# Patient Record
Sex: FEMALE | Race: WHITE | Employment: UNEMPLOYED | URBAN - METROPOLITAN AREA
[De-identification: names, ages, dates, MRNs, and addresses within clinical notes are randomized per-mention and may not be internally consistent; named-entity substitution may affect disease eponyms.]

---

## 2018-02-20 ENCOUNTER — HOSPITAL ENCOUNTER (OUTPATIENT)
Dept: MAMMOGRAPHY | Age: 53
Discharge: HOME | End: 2018-02-20
Attending: OBSTETRICS & GYNECOLOGY
Payer: MEDICARE

## 2018-02-20 DIAGNOSIS — Z12.31 VISIT FOR SCREENING MAMMOGRAM: ICD-10-CM

## 2018-02-20 PROCEDURE — 77067 SCR MAMMO BI INCL CAD: CPT

## 2018-04-18 ENCOUNTER — APPOINTMENT (RX ONLY)
Dept: URBAN - METROPOLITAN AREA CLINIC 24 | Facility: CLINIC | Age: 53
Setting detail: DERMATOLOGY
End: 2018-04-18

## 2018-04-18 DIAGNOSIS — L91.8 OTHER HYPERTROPHIC DISORDERS OF THE SKIN: ICD-10-CM

## 2018-04-18 DIAGNOSIS — L82.1 OTHER SEBORRHEIC KERATOSIS: ICD-10-CM

## 2018-04-18 DIAGNOSIS — D22 MELANOCYTIC NEVI: ICD-10-CM

## 2018-04-18 DIAGNOSIS — L81.4 OTHER MELANIN HYPERPIGMENTATION: ICD-10-CM

## 2018-04-18 DIAGNOSIS — L82.0 INFLAMED SEBORRHEIC KERATOSIS: ICD-10-CM

## 2018-04-18 PROBLEM — D22.5 MELANOCYTIC NEVI OF TRUNK: Status: ACTIVE | Noted: 2018-04-18

## 2018-04-18 PROCEDURE — 99203 OFFICE O/P NEW LOW 30 MIN: CPT | Mod: 25

## 2018-04-18 PROCEDURE — ? BENIGN DESTRUCTION

## 2018-04-18 PROCEDURE — ? SHAVE REMOVAL

## 2018-04-18 PROCEDURE — ? LIQUID NITROGEN

## 2018-04-18 PROCEDURE — ? COUNSELING

## 2018-04-18 PROCEDURE — 11301 SHAVE SKIN LESION 0.6-1.0 CM: CPT | Mod: 59

## 2018-04-18 PROCEDURE — 17110 DESTRUCTION B9 LES UP TO 14: CPT

## 2018-04-18 ASSESSMENT — LOCATION SIMPLE DESCRIPTION DERM
LOCATION SIMPLE: RIGHT UPPER BACK
LOCATION SIMPLE: RIGHT POSTERIOR THIGH
LOCATION SIMPLE: RIGHT SHOULDER
LOCATION SIMPLE: RIGHT THIGH
LOCATION SIMPLE: LEFT ANTERIOR NECK
LOCATION SIMPLE: ABDOMEN
LOCATION SIMPLE: LEFT UPPER BACK
LOCATION SIMPLE: RIGHT FOREARM

## 2018-04-18 ASSESSMENT — LOCATION DETAILED DESCRIPTION DERM
LOCATION DETAILED: RIGHT POSTERIOR SHOULDER
LOCATION DETAILED: EPIGASTRIC SKIN
LOCATION DETAILED: LEFT SUPERIOR LATERAL UPPER BACK
LOCATION DETAILED: RIGHT ANTERIOR PROXIMAL THIGH
LOCATION DETAILED: LEFT RIB CAGE
LOCATION DETAILED: LEFT INFERIOR LATERAL NECK
LOCATION DETAILED: LEFT INFERIOR ANTERIOR NECK
LOCATION DETAILED: RIGHT INFERIOR UPPER BACK
LOCATION DETAILED: RIGHT DISTAL POSTERIOR THIGH
LOCATION DETAILED: RIGHT PROXIMAL RADIAL DORSAL FOREARM

## 2018-04-18 ASSESSMENT — LOCATION ZONE DERM
LOCATION ZONE: LEG
LOCATION ZONE: NECK
LOCATION ZONE: TRUNK
LOCATION ZONE: ARM

## 2018-04-18 NOTE — PROCEDURE: LIQUID NITROGEN
Post-Care Instructions: I reviewed with the patient in detail post-care instructions. Patient is to wear sunprotection, and avoid picking at any of the treated lesions. Pt may apply Vaseline to crusted or scabbing areas.
Add 52 Modifier (Optional): no
Number Of Freeze-Thaw Cycles: 1 freeze-thaw cycle
Medical Necessity Information: It is in your best interest to select a reason for this procedure from the list below. All of these items fulfill various CMS LCD requirements except the new and changing color options.
Detail Level: Detailed
Consent: The patient's consent was obtained including but not limited to risks of crusting, scabbing, blistering, scarring, darker or lighter pigmentary change, recurrence, incomplete removal and infection.
Medical Necessity Clause: This procedure was medically necessary because the lesions was irritated and itchy.inflamed, bleeding.

## 2018-04-18 NOTE — PROCEDURE: SHAVE REMOVAL
Hemostasis: Electrodesiccation
Notification Instructions: Patient will be notified of biopsy results. However, patient instructed to call the office if not contacted within 2 weeks.
Medical Necessity Information: It is in your best interest to select a reason for this procedure from the list below. All of these items fulfill various CMS LCD requirements except the new and changing color options.
Bill 55789 For Specimen Handling/Conveyance To Laboratory?: no
X Size Of Lesion In Cm (Optional): 0
Detail Level: Detailed
Size Of Margin In Cm (Margins Are Not Added To Billing Dimensions): -
Anesthesia Volume In Cc: 0.4
Post-Care Instructions: I reviewed with the patient in detail post-care instructions. Patient is to keep the biopsy site dry overnight, and then apply bacitracin twice daily until healed. Patient may apply hydrogen peroxide soaks to remove any crusting.
Biopsy Method: Dermablade
Wound Care: Vaseline
Consent was obtained from the patient. The risks and benefits to therapy were discussed in detail. Specifically, the risks of infection, scarring, bleeding, prolonged wound healing, incomplete removal, allergy to anesthesia, nerve injury and recurrence were addressed. Prior to the procedure, the treatment site was clearly identified and confirmed by the patient. All components of Universal Protocol/PAUSE Rule completed.
Was A Bandage Applied: Yes
Size Of Lesion In Cm (Required): 0.8
Path Notes (To The Dermatopathologist): Please check margins.
Billing Type: Third-Party Bill
Anesthesia Type: 1% lidocaine with epinephrine and a 1:10 solution of 8.4% sodium bicarbonate

## 2018-04-18 NOTE — PROCEDURE: BENIGN DESTRUCTION
Add 52 Modifier (Optional): no
Detail Level: Detailed
Consent: The patient's consent was obtained including but not limited to risks of crusting, scabbing, blistering, scarring, darker or lighter pigmentary change, recurrence, incomplete removal and infection.
Medical Necessity Clause: This procedure was medically necessary because the lesions that were treated were: itchy, and irritated.
Anesthesia Volume In Cc: 0.5
Post-Care Instructions: I reviewed with the patient in detail post-care instructions. Patient is to wear sunprotection, and avoid picking at any of the treated lesions. Pt may apply Vaseline to crusted or scabbing areas.
Bill Insurance (You Assume Risk Of Denial Or Audit By Selecting Yes): Yes
Medical Necessity Information: It is in your best interest to select a reason for this procedure from the list below. All of these items fulfill various CMS LCD requirements except the new and changing color options.

## 2019-01-03 PROBLEM — E66.01 OBESITY, MORBID (HCC): Status: ACTIVE | Noted: 2019-01-03

## 2019-02-22 ENCOUNTER — HOSPITAL ENCOUNTER (OUTPATIENT)
Dept: MAMMOGRAPHY | Age: 54
Discharge: HOME | End: 2019-02-22
Attending: OBSTETRICS & GYNECOLOGY
Payer: MEDICARE

## 2019-02-22 DIAGNOSIS — Z12.31 VISIT FOR SCREENING MAMMOGRAM: ICD-10-CM

## 2019-02-22 PROCEDURE — 77067 SCR MAMMO BI INCL CAD: CPT

## 2019-06-03 ENCOUNTER — APPOINTMENT (RX ONLY)
Dept: URBAN - METROPOLITAN AREA CLINIC 24 | Facility: CLINIC | Age: 54
Setting detail: DERMATOLOGY
End: 2019-06-03

## 2019-06-03 DIAGNOSIS — D22 MELANOCYTIC NEVI: ICD-10-CM

## 2019-06-03 DIAGNOSIS — D485 NEOPLASM OF UNCERTAIN BEHAVIOR OF SKIN: ICD-10-CM

## 2019-06-03 DIAGNOSIS — L81.4 OTHER MELANIN HYPERPIGMENTATION: ICD-10-CM

## 2019-06-03 DIAGNOSIS — L82.1 OTHER SEBORRHEIC KERATOSIS: ICD-10-CM

## 2019-06-03 DIAGNOSIS — Z87.2 PERSONAL HISTORY OF DISEASES OF THE SKIN AND SUBCUTANEOUS TISSUE: ICD-10-CM

## 2019-06-03 PROBLEM — D48.5 NEOPLASM OF UNCERTAIN BEHAVIOR OF SKIN: Status: ACTIVE | Noted: 2019-06-03

## 2019-06-03 PROBLEM — D22.61 MELANOCYTIC NEVI OF RIGHT UPPER LIMB, INCLUDING SHOULDER: Status: ACTIVE | Noted: 2019-06-03

## 2019-06-03 PROCEDURE — 11103 TANGNTL BX SKIN EA SEP/ADDL: CPT

## 2019-06-03 PROCEDURE — 99213 OFFICE O/P EST LOW 20 MIN: CPT | Mod: 25

## 2019-06-03 PROCEDURE — ? BIOPSY BY SHAVE METHOD

## 2019-06-03 PROCEDURE — ? COUNSELING

## 2019-06-03 PROCEDURE — 11102 TANGNTL BX SKIN SINGLE LES: CPT

## 2019-06-03 ASSESSMENT — LOCATION SIMPLE DESCRIPTION DERM
LOCATION SIMPLE: ABDOMEN
LOCATION SIMPLE: RIGHT FOREARM
LOCATION SIMPLE: RIGHT THIGH
LOCATION SIMPLE: RIGHT UPPER ARM
LOCATION SIMPLE: RIGHT SHOULDER
LOCATION SIMPLE: RIGHT POSTERIOR THIGH
LOCATION SIMPLE: LEFT UPPER BACK
LOCATION SIMPLE: CHEST
LOCATION SIMPLE: RIGHT UPPER BACK

## 2019-06-03 ASSESSMENT — LOCATION DETAILED DESCRIPTION DERM
LOCATION DETAILED: EPIGASTRIC SKIN
LOCATION DETAILED: RIGHT ANTERIOR PROXIMAL THIGH
LOCATION DETAILED: RIGHT VENTRAL PROXIMAL FOREARM
LOCATION DETAILED: RIGHT POSTERIOR SHOULDER
LOCATION DETAILED: RIGHT ANTERIOR PROXIMAL UPPER ARM
LOCATION DETAILED: RIGHT DISTAL POSTERIOR THIGH
LOCATION DETAILED: RIGHT PROXIMAL RADIAL DORSAL FOREARM
LOCATION DETAILED: LEFT SUPERIOR LATERAL UPPER BACK
LOCATION DETAILED: RIGHT MEDIAL SUPERIOR CHEST
LOCATION DETAILED: RIGHT INFERIOR UPPER BACK

## 2019-06-03 ASSESSMENT — LOCATION ZONE DERM
LOCATION ZONE: TRUNK
LOCATION ZONE: LEG
LOCATION ZONE: ARM

## 2019-06-03 NOTE — HPI: SKIN LESION
What Type Of Note Output Would You Prefer (Optional)?: Standard Output
How Severe Is Your Skin Lesion?: mild
Has Your Skin Lesion Been Treated?: not been treated
Is This A New Presentation, Or A Follow-Up?: Skin Lesion
Additional History: Had run a stick in the leg accidentally a year ago and it took 4 months to heal

## 2019-06-03 NOTE — PROCEDURE: BIOPSY BY SHAVE METHOD
Curettage Text: The wound bed was treated with curettage after the biopsy was performed.
Anesthesia Volume In Cc: 0.5
Wound Care: Petrolatum
Silver Nitrate Text: The wound bed was treated with silver nitrate after the biopsy was performed.
Was A Bandage Applied: Yes
Anesthesia Type: 1% lidocaine with 1:100,000 epinephrine and a 1:6 solution of 8.4% sodium bicarbonate
Bill 18371 For Specimen Handling/Conveyance To Laboratory?: no
Depth Of Biopsy: dermis
Type Of Destruction Used: Curettage
Detail Level: Detailed
Hemostasis: Aluminum Chloride
X Size Of Lesion In Cm: 0
Biopsy Type: H and E
Electrodesiccation And Curettage Text: The wound bed was treated with electrodesiccation and curettage after the biopsy was performed.
Biopsy Method: Dermablade
Dressing: bandage
Billing Type: Third-Party Bill
Cryotherapy Text: The wound bed was treated with cryotherapy after the biopsy was performed.
Electrodesiccation Text: The wound bed was treated with electrodesiccation after the biopsy was performed.
Accession #: PC

## 2020-07-10 ENCOUNTER — APPOINTMENT (RX ONLY)
Dept: URBAN - METROPOLITAN AREA CLINIC 24 | Facility: CLINIC | Age: 55
Setting detail: DERMATOLOGY
End: 2020-07-10

## 2020-07-10 DIAGNOSIS — L82.1 OTHER SEBORRHEIC KERATOSIS: ICD-10-CM

## 2020-07-10 DIAGNOSIS — L82.0 INFLAMED SEBORRHEIC KERATOSIS: ICD-10-CM

## 2020-07-10 DIAGNOSIS — L81.4 OTHER MELANIN HYPERPIGMENTATION: ICD-10-CM

## 2020-07-10 DIAGNOSIS — Z87.2 PERSONAL HISTORY OF DISEASES OF THE SKIN AND SUBCUTANEOUS TISSUE: ICD-10-CM

## 2020-07-10 DIAGNOSIS — D22 MELANOCYTIC NEVI: ICD-10-CM

## 2020-07-10 PROBLEM — D22.62 MELANOCYTIC NEVI OF LEFT UPPER LIMB, INCLUDING SHOULDER: Status: ACTIVE | Noted: 2020-07-10

## 2020-07-10 PROBLEM — D22.61 MELANOCYTIC NEVI OF RIGHT UPPER LIMB, INCLUDING SHOULDER: Status: ACTIVE | Noted: 2020-07-10

## 2020-07-10 PROCEDURE — ? COUNSELING

## 2020-07-10 PROCEDURE — ? LIQUID NITROGEN

## 2020-07-10 PROCEDURE — 99214 OFFICE O/P EST MOD 30 MIN: CPT | Mod: 25

## 2020-07-10 PROCEDURE — ? SHAVE REMOVAL

## 2020-07-10 PROCEDURE — 11300 SHAVE SKIN LESION 0.5 CM/<: CPT | Mod: 59

## 2020-07-10 PROCEDURE — 17110 DESTRUCTION B9 LES UP TO 14: CPT

## 2020-07-10 ASSESSMENT — LOCATION SIMPLE DESCRIPTION DERM
LOCATION SIMPLE: ABDOMEN
LOCATION SIMPLE: LEFT POSTERIOR AXILLA
LOCATION SIMPLE: LEFT SHOULDER
LOCATION SIMPLE: LEFT UPPER BACK
LOCATION SIMPLE: RIGHT SHOULDER
LOCATION SIMPLE: RIGHT THIGH
LOCATION SIMPLE: RIGHT UPPER BACK
LOCATION SIMPLE: RIGHT POSTERIOR THIGH
LOCATION SIMPLE: RIGHT FOREARM

## 2020-07-10 ASSESSMENT — LOCATION DETAILED DESCRIPTION DERM
LOCATION DETAILED: RIGHT ANTERIOR PROXIMAL THIGH
LOCATION DETAILED: LEFT ANTERIOR SHOULDER
LOCATION DETAILED: RIGHT INFERIOR UPPER BACK
LOCATION DETAILED: LEFT POSTERIOR AXILLA
LOCATION DETAILED: RIGHT DISTAL POSTERIOR THIGH
LOCATION DETAILED: RIGHT PROXIMAL RADIAL DORSAL FOREARM
LOCATION DETAILED: RIGHT VENTRAL PROXIMAL FOREARM
LOCATION DETAILED: RIGHT POSTERIOR SHOULDER
LOCATION DETAILED: LEFT SUPERIOR LATERAL UPPER BACK
LOCATION DETAILED: EPIGASTRIC SKIN

## 2020-07-10 ASSESSMENT — LOCATION ZONE DERM
LOCATION ZONE: ARM
LOCATION ZONE: AXILLAE
LOCATION ZONE: LEG
LOCATION ZONE: TRUNK

## 2020-07-10 NOTE — PROCEDURE: LIQUID NITROGEN
Medical Necessity Information: It is in your best interest to select a reason for this procedure from the list below. All of these items fulfill various CMS LCD requirements except the new and changing color options.
Render Note In Bullet Format When Appropriate: No
Medical Necessity Clause: This procedure was medically necessary because the lesions was irritated and itchy.inflamed, bleeding.
Post-Care Instructions: I reviewed with the patient in detail post-care instructions. Patient is to wear sunprotection, and avoid picking at any of the treated lesions. Pt may apply Vaseline to crusted or scabbing areas.
Detail Level: Detailed
Consent: The patient's consent was obtained including but not limited to risks of crusting, scabbing, blistering, scarring, darker or lighter pigmentary change, recurrence, incomplete removal and infection.
Number Of Freeze-Thaw Cycles: 2 freeze-thaw cycles

## 2021-04-02 ENCOUNTER — TRANSCRIBE ORDER (OUTPATIENT)
Dept: SCHEDULING | Age: 56
End: 2021-04-02

## 2021-04-02 DIAGNOSIS — Z12.31 SCREENING MAMMOGRAM FOR HIGH-RISK PATIENT: Primary | ICD-10-CM

## 2021-04-09 ENCOUNTER — HOSPITAL ENCOUNTER (OUTPATIENT)
Dept: MAMMOGRAPHY | Age: 56
Discharge: HOME | End: 2021-04-09
Attending: OBSTETRICS & GYNECOLOGY
Payer: MEDICARE

## 2021-04-09 DIAGNOSIS — Z12.31 SCREENING MAMMOGRAM FOR HIGH-RISK PATIENT: ICD-10-CM

## 2021-04-09 PROCEDURE — 77067 SCR MAMMO BI INCL CAD: CPT

## 2021-07-12 ENCOUNTER — APPOINTMENT (RX ONLY)
Dept: URBAN - METROPOLITAN AREA CLINIC 24 | Facility: CLINIC | Age: 56
Setting detail: DERMATOLOGY
End: 2021-07-12

## 2021-07-12 DIAGNOSIS — Z71.89 OTHER SPECIFIED COUNSELING: ICD-10-CM

## 2021-07-12 DIAGNOSIS — L57.0 ACTINIC KERATOSIS: ICD-10-CM

## 2021-07-12 DIAGNOSIS — L57.8 OTHER SKIN CHANGES DUE TO CHRONIC EXPOSURE TO NONIONIZING RADIATION: ICD-10-CM

## 2021-07-12 DIAGNOSIS — Z87.2 PERSONAL HISTORY OF DISEASES OF THE SKIN AND SUBCUTANEOUS TISSUE: ICD-10-CM

## 2021-07-12 PROCEDURE — ? LIQUID NITROGEN

## 2021-07-12 PROCEDURE — ? COUNSELING

## 2021-07-12 PROCEDURE — 99213 OFFICE O/P EST LOW 20 MIN: CPT | Mod: 25

## 2021-07-12 PROCEDURE — 17000 DESTRUCT PREMALG LESION: CPT

## 2021-07-12 ASSESSMENT — LOCATION DETAILED DESCRIPTION DERM
LOCATION DETAILED: LEFT ANTERIOR PROXIMAL UPPER ARM
LOCATION DETAILED: RIGHT INFERIOR CENTRAL MALAR CHEEK
LOCATION DETAILED: RIGHT SUPERIOR MEDIAL UPPER BACK
LOCATION DETAILED: RIGHT INFERIOR UPPER BACK
LOCATION DETAILED: LEFT MEDIAL SUPERIOR CHEST

## 2021-07-12 ASSESSMENT — LOCATION SIMPLE DESCRIPTION DERM
LOCATION SIMPLE: LEFT UPPER ARM
LOCATION SIMPLE: RIGHT CHEEK
LOCATION SIMPLE: CHEST
LOCATION SIMPLE: RIGHT UPPER BACK

## 2021-07-12 ASSESSMENT — LOCATION ZONE DERM
LOCATION ZONE: ARM
LOCATION ZONE: TRUNK
LOCATION ZONE: FACE

## 2021-07-12 NOTE — PROCEDURE: LIQUID NITROGEN
Post-Care Instructions: I reviewed with the patient in detail post-care instructions. Patient is to wear sunprotection, and avoid picking at any of the treated lesions. Pt may apply Vaseline to crusted or scabbing areas.
Detail Level: Simple
Number Of Freeze-Thaw Cycles: 1 freeze-thaw cycle
Render Post-Care Instructions In Note?: no
Consent: The patient's consent was obtained including but not limited to risks of crusting, scabbing, blistering, scarring, darker or lighter pigmentary change, recurrence, incomplete removal and infection.
Duration Of Freeze Thaw-Cycle (Seconds): 3
Show Applicator Variable?: Yes

## 2021-07-12 NOTE — PROCEDURE: COUNSELING
Detail Level: Simple
Sunscreen Recommendations: 50 SPF+, sun protective clothing
Detail Level: Generalized
Detail Level: Detailed

## 2021-10-25 ENCOUNTER — HOSPITAL ENCOUNTER (OUTPATIENT)
Dept: PHYSICAL THERAPY | Age: 56
Discharge: HOME | End: 2021-10-25
Payer: MEDICARE

## 2021-10-25 PROCEDURE — 97166 OT EVAL MOD COMPLEX 45 MIN: CPT

## 2021-10-25 PROCEDURE — 97535 SELF CARE MNGMENT TRAINING: CPT

## 2021-10-25 NOTE — THERAPY EVALUATION
Keyur Noorvik  : 1965  Primary: Chana Mei Medicare Complete  Secondary:  [address]  at [address]  [address], [address]   [phone]   [phone]           OUTPATIENT OCCUPATIONAL THERAPY: Initial Assessment and Daily Note 10/25/2021    ICD-10: Treatment Diagnosis: I89.0 lymphedema not elsewhere classifed, M79.609 pain in jguhuT06.89 swelling of both LE's  Precautions/Allergies:   Sulfamethoxazole-trimethoprim   Fall Risk Score:    Ambulatory/Rehab Services H2 Model Falls Risk Assessment    Risk Factors:       No Risk Factors Identified Ability to Rise from Chair:       (1)  Pushes up, successful in one attempt    Falls Prevention Plan:       No modifications necessary   Total: (5 or greater = High Risk): 1     [facility] of [employer]. All Rights Reserved. [facility] Patent #[id number]. Federal Law prohibits the replication, distribution or use without written permission from [employer]     MD Orders: eval and treat MEDICAL/REFERRING DIAGNOSIS:   Lymphedema, not elsewhere classified [I89.0]   DATE OF ONSET: chronic   REFERRING PHYSICIAN: Isai, [address]: to be determined      INITIAL ASSESSMENT:  Ms. 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 Historically, she has been able to manage lymphedema by working out in the gym 3-4 days per week both gym and pool exercises. However, her gym closed during the pandemic and she adopted a very sedentary lifestyle which caused weight gain. She tested positive for COVID a few weeks ago and continues to exhibit decreased endurance which has made her even more sedentary. Since COVID, LE pain and swelling have further increased. DVT was ruled out.  She ordered compression knee highs but was unable to don them so she has been bandaging her legs with ACE wraps with assistance from her spouse. . She presents with lipolymphedema with adiposity and large lobules in the thigh region. Swelling is concentrated below knee with ankle cuff and sparing of the feet. She would benefit from skilled OT for complete decongestive therapy to decrease LE swelling, explore compression options and teach Ms. Mena Raf how to manage condition at home. PLAN OF CARE:   PROBLEM LIST:  1. Increased Pain  2. Decreased Activity Tolerance  3. Edema/Girth  4. Decreased Skin Integrity/Hygeine  5. Decreased Chromo with Home Exercise Program INTERVENTIONS PLANNED  1. Skin care  2. Compression bandaging  3. Fitting for compression garment(s)  4. Manual therapy/Manual lymph drainage  5. Therapeutic exercise/Therapeutic activities  6. Patient Education  7. Compression pump trial prn  8.  kinesiotaping    TREATMENT PLAN:  Effective Dates: 10/25/2021 to 1/23/2022 Frequency/Duration: 1x/week up to 90 days  1 xweek x90 days  and upon reassessment. Will adjust frequency and duration as progress indicates. GOALS: (Goals have been discussed and agreed upon with patient.)  Short-Term Functional Goals: Time Frame: 45 days  1. The patient/caregiver will verbalize understanding of lymphedema precautions. 2. Patient will be independent with skin care regimen to decrease risk of cellulitis. 3. The patient/caregiver will be independent at donning and doffing bilateral lower extremity compression bandages. 4. The patient/caregiver will be independent with self-manual lymph drainage techniques and show decrease in limb volume. 5. Patient will be independent in lymphatic exercises. Discharge Goals: Time Frame: 90 days  1. Patient's bilateral lower extremity circumferential measurements will decrease on volumetric graph by 5-7cmto maximize functional use in ADL's.    2. The patient/caregiver will be independent with home management of lymphedema.     3. Patient/caregiver will be independent donning and doffing bilateral lower extremity compression garment. Rehabilitation Potential For Stated Goals: Good  Regarding Hayes Linda Hauser's therapy, I certify that the treatment plan above will be carried out by a therapist or under their direction. Thank you for this referral,  Micky Chávez OTR/L, CLT     Referring Physician Signature: Kelley Momin, * _________________________  Date _________            The information in this section was collected on 10/25/2021   (except where otherwise noted). OCCUPATIONAL PROFILE & HISTORY:   History of Present Injury/Illness (Reason for Referral):  Ms. 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 Historically, she has been able to manage lymphedema by working out in the gym 3-4 days per week both gym and pool exercises. However, her gym closed during the pandemic and she adopted a very sedentary lifestyle which caused weight gain. She tested positive for COVID a few weeks ago and continues to exhibit decreased endurance which has made her even more sedentary. Since COVID, LE pain and swelling have further increased. DVT was ruled out. She ordered compression knee highs but was unable to don them so she has been bandaging her legs with ACE wraps with assistance from her spouse. . She presents with lipolymphedema with adiposity and large lobules in the thigh region. Swelling is concentrated below knee with ankle cuff and sparing of the feet. She would benefit from skilled OT for complete decongestive therapy to decrease LE swelling, explore compression options and teach Ms. Alexandr Willis how to manage condition at home. Past Medical History/Comorbidities:   Ms. Alexandr Willis  has a past medical history of Arthritis, Chronic pain, COVID, Morbid obesity ([address] Morbidly obese ([address] Psychiatric disorder, and Unspecified sleep apnea.  She also has no past medical history of Aneurysm ([address] Arrhythmia, Asthma, Autoimmune disease ([address] CAD (coronary artery disease), Cancer ([address] Chronic kidney disease, Coagulation defects, COPD, Diabetes ([address] Difficult intubation, GERD (gastroesophageal reflux disease), Heart failure ([address] Hypertension, Liver disease, Malignant hyperthermia due to anesthesia, Nausea & vomiting, Other ill-defined conditions(799.89), Pseudocholinesterase deficiency, PUD (peptic ulcer disease), Seizures ([address] Stroke ([address] Thromboembolus ([address] Thyroid disease, or Unspecified adverse effect of anesthesia. Ms. Tessa Gatica  has a past surgical history that includes hx cholecystectomy (2004); hx gastric bypass (2004); neurological procedure unlisted (3/2009); hx orthopaedic; hx back surgery; hx hysterectomy (2011); hx colonoscopy; hx gastric bypass; pr lap,cholecystectomy; and hx bladder suspension. Social History/Living Environment:     pt lives at home with spouse with 4 steps for entry  Prior Level of Function/Work/Activity:  Disabled - sedentary lifestyle  Dominant Side:         RIGHT  Previous Treatment Approaches:          No therapy to date - compression socks/ACE bandages   Current Medications:    Current Outpatient Medications:     estradioL (ESTRACE) 2 mg tablet, Take 1 Tablet by mouth daily. , Disp: 90 Tablet, Rfl: 4    diphenhydrAMINE (BENADRYL) 25 mg capsule, Take 25 mg by mouth., Disp: , Rfl:     potassium 99 mg tablet, Take 99 mg by mouth daily. , Disp: , Rfl:     hydroCHLOROthiazide (MICROZIDE) 12.5 mg capsule, Take 12.5 mg by mouth Every morning., Disp: , Rfl:     melatonin 5 mg tablet, Take 10 mg by mouth. (Patient not taking: Reported on 10/20/2021), Disp: , Rfl:     traMADoL (ULTRAM) 50 mg tablet, Take 50 mg by mouth every six (6) hours as needed. , Disp: , Rfl:     ferrous sulfate (IRON, FERROUS SULFATE,) 325 mg (65 mg elemental iron) tablet, Take 65 mg by mouth Daily (before breakfast). , Disp: , Rfl:     MULTIVITAMIN WITH IRON-MINERAL PO, Take 1 Tab by mouth daily. , Disp: , Rfl:    VITAMIN B-12 PO, Take 1,000 mg by mouth daily. , Disp: , Rfl:     CALCIUM 500 PO, Take 1 Tab by mouth daily. , Disp: , Rfl:             Date Last Reviewed:  10/25/2021   Complexity Level : Expanded review of therapy/medical records (1-2):  MODERATE COMPLEXITY   ASSESSMENT OF OCCUPATIONAL PERFORMANCE:   Palpation:          Bilateral LE lipolymphedema with significant adiposity and fat lobules in thigh region - swelling is below knee/calf region with no swelling in feet/toes - ankle cuff present bilaterally, legs are tender to the touch   ROM:          WFL  Strength:          WFL  Special Tests:          Stemmer sign negative   Skin Integrity:          Skin is intact/no skin changes, pt uses Lubriderm lotion daily   Sensation:  Intact to light touch  Functional Mobility:  Independent with with decreased activity tolerance   Activities of Daily Living :independent with decreased activity tolerance   Edema/Girth:   PRETREATMENT AFFECTED LIMB(s): right lower extremity  left lower extremity      Date:  10-25-21         Right / Left           Groin   []      []           8 inches   []      []           4 inches   []      []         PoplitealSpace   [x]      [x] 54.5/53          8 inches   [x]      [x] 53/56          4 inches   [x]      [x] 36/41          Ankle   [x]      [x] 28.5/30          Instep   [x]      [x] 23.5/23.5        Measurements are taken in centimeters:  2.54 cm = 1 inch  Total:right 195.5cm        left 203.5cm              Physical Skills Involved:  1. Activity Tolerance  2. Edema  3. pain Cognitive Skills Affected (resulting in the inability to perform in a timely and safe manner):  1. Psychosocial Skills Affected:  1. Habits/Routines   Number of elements that affect the Plan of Care: 3-5:  MODERATE COMPLEXITY   CLINICAL DECISION MAKING:   Outcome Measure: Tool Used: Tool Used: Lymphedema Life Impact Scale   Score:  Initial: 39 Most Recent: X (Date: -- )   Interpretation of Score:  The Lymphedema Life Impact Scale (LLIS) is a validated instrument that measures the physical, functional, and psychosocial concerns pertinent to patients with extremity lymphedema. The Scale's questionnaire is administered to patients to gauge impairments, activity limitations, and participation restrictions resulting from their lymphedema. Score 0 1-13 14-26 27-40 41-54 55-67 68   Modifier CH CI CJ CK CL CM CN     ? Other PT/OT Primary Functional Limitations:     - CURRENT STATUS: CK - 40%-59% impaired, limited or restricted    - GOAL STATUS: CJ - 20%-39% impaired, limited or restricted    - D/C STATUS:  ---------------To be determined---------------       Medical Necessity:   · Skilled intervention continues to be required due to uncontrolled swelling increasing risk of cellulitis and hindering ADL's. Reason for Services/Other Comments:  · Patient continues to require skilled intervention due to inability to self manage lymphedema at this time. Clinical Decision-Making Assessment:     Use of outcome tool(s) and clinical judgement create a POC that gives a: Questionable prediction of patient's progress: MODERATE COMPLEXITY   TREATMENT:   (In addition to Assessment/Re-Assessment sessions the following treatments were rendered)    Pre-treatment Symptoms/Complaints:  lipolymphedema with increased LE swelling and pain from baseline measurements  Pain: Initial:   Pain Intensity 1: 7  Post Session:  7   Occupational Therapy Treatments:    OT eval( x ) OT eval was completed 10-25-21. Pt received information on lymphedema and risk reduction/self management practices as outlined by the National Lymphedema Network. Therapeutic Exercise ( minutes):     HEP:  As above; handouts given to patient for all exercises.   Manual Therapy:(minutes)          Manual Lymph Drainage          Lymph Nodes:    Cervical Supraclavicular Axillary Abdominal Inguinal Popliteal Antecubital   RIGHT []     []     []     []     []     []     [] LEFT []     []     []     []     []     []     []          Anastamoses:   Axillo-axillary Inguino-inguinal Axillo-inguinal Inguino-axillary   ANTERIOR []     []     []     []       POSTERIOR []     []     []     []       RIGHT []     []     []     []       LEFT []     []     []     []         Limbs:   []    RUE     []    LUE     []    RLE    []    LLE   ADL/Self Care: (30 minutes): Pt was educated on lymph pathology, CDT, skin integrity management, precautions, exercise/elevation. After skin care, a multi layered compression bandage was applied to bilateral LE's from feet to knees using 5\"biagrip stockinet, lymphesoft foam, 2 short stretch bandages per She was taught principles and techniques of self bandaging with short stretch bandages. Spouse can assist her at home with bandages - they have been bandaging at home with ACE bandages. Pt was instructed to remove if any medical complications ie SOB/increased pain. She was also instructed and given handout on LE exercises to perform while wearing bandages. Treatment/Session Assessment:    · Response to Treatment:  Pt's goal for therapy is to reduce LE swelling and pain back to baseline and explore compression options. She has high co-pay so request 1x/week therapy. She is able to bandage at home with spouse's help and also plans to resume gym exercises. Her insurance pays for her gym membership. Therapy will focus on reducing swelling and teaching pt self management techniques for home program.   · Compliance with Program/Exercises: Will assess as treatment progresses. · Recommendations/Intent for next treatment session: \"Next visit will focus on complete decongestive therapy- reduction phase\".   Total Treatment Duration:60min  OT Patient Time In/Time Out  Time In: 1000  Time Out: [id number] [facility] RYAN NarvaezR/L, TOMAS

## 2021-11-02 ENCOUNTER — HOSPITAL ENCOUNTER (OUTPATIENT)
Dept: PHYSICAL THERAPY | Age: 56
Discharge: HOME | End: 2021-11-02
Payer: MEDICARE

## 2021-11-02 PROCEDURE — 97140 MANUAL THERAPY 1/> REGIONS: CPT

## 2021-11-02 PROCEDURE — 97535 SELF CARE MNGMENT TRAINING: CPT

## 2021-11-02 NOTE — PROGRESS NOTES
Giovanni Galdamez  : 1965  Primary: Hermila Failing Aarp Medicare Complete  Secondary:  [address] [place]  at [place] [address] Therapy  [address], [address]  [phone]   [phone]           OUTPATIENT OCCUPATIONAL THERAPY: Daily Note 2021    ICD-10: Treatment Diagnosis: I89.0 lymphedema not elsewhere classifed, M79.609 pain in hybjwQ97.89 swelling of both LE's  Precautions/Allergies:   Sulfamethoxazole-trimethoprim   Fall Risk Score:    Ambulatory/Rehab Services H2 Model Falls Risk Assessment    Risk Factors:       No Risk Factors Identified Ability to Rise from Chair:       (1)  Pushes up, successful in one attempt    Falls Prevention Plan:       No modifications necessary   Total: (5 or greater = High Risk): 1     [facility] [employer]. All Rights Reserved. [facility] [employer] Patent #[id number]. Federal Law prohibits the replication, distribution or use without written permission from [employer]     MD Orders: eval and treat MEDICAL/REFERRING DIAGNOSIS:   Lymphedema, not elsewhere classified [I89.0]   DATE OF ONSET: chronic   REFERRING PHYSICIAN: Isai, [address] [facility],[address]: to be determined      INITIAL ASSESSMENT:  Ms. 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 Historically, she has been able to manage lymphedema by working out in the gym 3-4 days per week both gym and pool exercises. However, her gym closed during the pandemic and she adopted a very sedentary lifestyle which caused weight gain. She tested positive for COVID a few weeks ago and continues to exhibit decreased endurance which has made her even more sedentary. Since COVID, LE pain and swelling have further increased. DVT was ruled out. She ordered compression knee highs but was unable to don them so she has been bandaging her legs with ACE wraps with assistance from her spouse.  . She presents with lipolymphedema with adiposity and large lobules in the thigh region. Swelling is concentrated below knee with ankle cuff and sparing of the feet. She would benefit from skilled OT for complete decongestive therapy to decrease LE swelling, explore compression options and teach Ms. Dennis Lu how to manage condition at home. PLAN OF CARE:   PROBLEM LIST:  1. Increased Pain  2. Decreased Activity Tolerance  3. Edema/Girth  4. Decreased Skin Integrity/Hygeine  5. Decreased [place] with Home Exercise Program INTERVENTIONS PLANNED  1. Skin care  2. Compression bandaging  3. Fitting for compression garment(s)  4. Manual therapy/Manual lymph drainage  5. Therapeutic exercise/Therapeutic activities  6. Patient Education  7. Compression pump trial prn  8.  kinesiotaping    TREATMENT PLAN:  Effective Dates: 10/25/2021 to 1/23/2022 Frequency/Duration: 1x/week up to 90 days  1 xweek x90 days  and upon reassessment. Will adjust frequency and duration as progress indicates. GOALS: (Goals have been discussed and agreed upon with patient.)  Short-Term Functional Goals: Time Frame: 45 days  1. The patient/caregiver will verbalize understanding of lymphedema precautions. 2. Patient will be independent with skin care regimen to decrease risk of cellulitis. 3. The patient/caregiver will be independent at donning and doffing bilateral lower extremity compression bandages. 4. The patient/caregiver will be independent with self-manual lymph drainage techniques and show decrease in limb volume. 5. Patient will be independent in lymphatic exercises. Discharge Goals: Time Frame: 90 days  1. Patient's bilateral lower extremity circumferential measurements will decrease on volumetric graph by 5-7cmto maximize functional use in ADL's.    2. The patient/caregiver will be independent with home management of lymphedema.     3. Patient/caregiver will be independent donning and doffing bilateral lower extremity compression garment. Rehabilitation Potential For Stated Goals: Good  Regarding Tiffanie Hauser's therapy, I certify that the treatment plan above will be carried out by a therapist or under their direction. Thank you for this referral,  Naheed Lopez OTR/L, CLT                 The information in this section was collected on 11/2/2021   (except where otherwise noted). OCCUPATIONAL PROFILE & HISTORY:   History of Present Injury/Illness (Reason for Referral):  Ms. 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 Historically, she has been able to manage lymphedema by working out in the gym 3-4 days per week both gym and pool exercises. However, her gym closed during the pandemic and she adopted a very sedentary lifestyle which caused weight gain. She tested positive for COVID a few weeks ago and continues to exhibit decreased endurance which has made her even more sedentary. Since COVID, LE pain and swelling have further increased. DVT was ruled out. She ordered compression knee highs but was unable to don them so she has been bandaging her legs with ACE wraps with assistance from her spouse. . She presents with lipolymphedema with adiposity and large lobules in the thigh region. Swelling is concentrated below knee with ankle cuff and sparing of the feet. She would benefit from skilled OT for complete decongestive therapy to decrease LE swelling, explore compression options and teach Ms. Jian Mullen how to manage condition at home. Past Medical History/Comorbidities:   Ms. Jian Mullen  has a past medical history of Arthritis, Chronic pain, COVID, Morbid obesity ([address] Morbidly obese ([address] Psychiatric disorder, and Unspecified sleep apnea.  She also has no past medical history of Aneurysm ([address] Arrhythmia, Asthma, Autoimmune disease ([address] CAD (coronary artery disease), Cancer ([address] Chronic kidney disease, Coagulation defects, COPD, Diabetes ([address] Difficult intubation, GERD (gastroesophageal reflux disease), Heart failure ([address] Hypertension, Liver disease, Malignant hyperthermia due to anesthesia, Nausea & vomiting, Other ill-defined conditions(799.89), Pseudocholinesterase deficiency, PUD (peptic ulcer disease), Seizures ([address] Stroke ([address] Thromboembolus ([address] Thyroid disease, or Unspecified adverse effect of anesthesia. Ms. Jeff Soares  has a past surgical history that includes hx cholecystectomy (2004); hx gastric bypass (2004); neurological procedure unlisted (3/2009); hx orthopaedic; hx back surgery; hx hysterectomy (2011); hx colonoscopy; hx gastric bypass; pr lap,cholecystectomy; and hx bladder suspension. Social History/Living Environment:     pt lives at home with spouse with 4 steps for entry  Prior Level of Function/Work/Activity:  Disabled - sedentary lifestyle  Dominant Side:         RIGHT  Previous Treatment Approaches:          No therapy to date - compression socks/ACE bandages   Current Medications:    Current Outpatient Medications:     estradioL (ESTRACE) 2 mg tablet, Take 1 Tablet by mouth daily. , Disp: 90 Tablet, Rfl: 4    diphenhydrAMINE (BENADRYL) 25 mg capsule, Take 25 mg by mouth., Disp: , Rfl:     potassium 99 mg tablet, Take 99 mg by mouth daily. , Disp: , Rfl:     hydroCHLOROthiazide (MICROZIDE) 12.5 mg capsule, Take 12.5 mg by mouth Every morning., Disp: , Rfl:     melatonin 5 mg tablet, Take 10 mg by mouth. (Patient not taking: Reported on 10/20/2021), Disp: , Rfl:     traMADoL (ULTRAM) 50 mg tablet, Take 50 mg by mouth every six (6) hours as needed. , Disp: , Rfl:     ferrous sulfate (IRON, FERROUS SULFATE,) 325 mg (65 mg elemental iron) tablet, Take 65 mg by mouth Daily (before breakfast). , Disp: , Rfl:     MULTIVITAMIN WITH IRON-MINERAL PO, Take 1 Tab by mouth daily. , Disp: , Rfl:     VITAMIN B-12 PO, Take 1,000 mg by mouth daily. , Disp: , Rfl:     CALCIUM 500 PO, Take 1 Tab by mouth daily. , Disp: , Rfl: Date Last Reviewed:  11/2/2021   Complexity Level : Expanded review of therapy/medical records (1-2):  MODERATE COMPLEXITY   ASSESSMENT OF OCCUPATIONAL PERFORMANCE:   Palpation:          Bilateral LE lipolymphedema with significant adiposity and fat lobules in thigh region - swelling is below knee/calf region with no swelling in feet/toes - ankle cuff present bilaterally, legs are tender to the touch   ROM:          WFL  Strength:          WFL  Special Tests:          Stemmer sign negative   Skin Integrity:          Skin is intact/no skin changes, pt uses Lubriderm lotion daily   Sensation:  Intact to light touch  Functional Mobility:  Independent with with decreased activity tolerance   Activities of Daily Living :independent with decreased activity tolerance   Edema/Girth:   PRETREATMENT AFFECTED LIMB(s): right lower extremity  left lower extremity      Date:  10-25-21 11-2-21        Right / Left           Groin   []      []           8 inches   []      []           4 inches   []      []         PoplitealSpace   [x]      [x] 54.5/53 52/53         8 inches   [x]      [x] 53/56 50/52         4 inches   [x]      [x] 36/41 35.5/36         Ankle   [x]      [x] 28.5/30 26/27         Instep   [x]      [x] 23.5/23.5 22.5/22       Measurements are taken in centimeters:  2.54 cm = 1 inch  Total:right 195.5cm 186.0cm       left 203.5cm 190.0cm             Physical Skills Involved:  1. Activity Tolerance  2. Edema  3. pain Cognitive Skills Affected (resulting in the inability to perform in a timely and safe manner):  1. Psychosocial Skills Affected:  1. Habits/Routines   Number of elements that affect the Plan of Care: 3-5:  MODERATE COMPLEXITY   CLINICAL DECISION MAKING:   Outcome Measure: Tool Used: Tool Used: Lymphedema Life Impact Scale   Score:  Initial: 39 Most Recent: X (Date: -- )   Interpretation of Score:  The Lymphedema Life Impact Scale (LLIS) is a validated instrument that measures the physical, functional, and psychosocial concerns pertinent to patients with extremity lymphedema. The Scale's questionnaire is administered to patients to gauge impairments, activity limitations, and participation restrictions resulting from their lymphedema. Score 0 1-13 14-26 27-40 41-54 55-67 68   Modifier CH CI CJ CK CL CM CN     ? Other PT/OT Primary Functional Limitations:     - CURRENT STATUS: CK - 40%-59% impaired, limited or restricted    - GOAL STATUS: CJ - 20%-39% impaired, limited or restricted    - D/C STATUS:  ---------------To be determined---------------       Medical Necessity:   · Skilled intervention continues to be required due to uncontrolled swelling increasing risk of cellulitis and hindering ADL's. Reason for Services/Other Comments:  · Patient continues to require skilled intervention due to inability to self manage lymphedema at this time. Clinical Decision-Making Assessment:     Use of outcome tool(s) and clinical judgement create a POC that gives a: Questionable prediction of patient's progress: MODERATE COMPLEXITY   TREATMENT:   (In addition to Assessment/Re-Assessment sessions the following treatments were rendered)    Pre-treatment Symptoms/Complaints: Pt has been bandaging as much as she can and wearing biagrip when not in bandages. Overall total of circumferential measurements decreased by 9.5cm in the RLE and 13.5cm in the LLE. She is interested in Blenda Giovanni. Will request Rx from MD and order from [facility]. Pain: Initial:   Pain Intensity 1: 7  Post Session:  6   Occupational Therapy Treatments:    OT eval( x ) OT eval was completed 10-25-21. Pt received information on lymphedema and risk reduction/self management practices as outlined by the National Lymphedema Network. Therapeutic Exercise ( minutes):     HEP:  As above; handouts given to patient for all exercises. Manual Therapy:(45minutes)MLD treatment to decrease swelling in LE's.  Pt was instructed on self MLD for home program and given handout with visual and verbal instructions. Manual Lymph Drainage          Lymph Nodes:    Cervical Supraclavicular Axillary Abdominal Inguinal Popliteal Antecubital   RIGHT []     [x]     [x]     [x]     [x]     [x]     []       LEFT []     [x]     [x]     [x]     [x]     [x]     []          Anastamoses:   Axillo-axillary Inguino-inguinal Axillo-inguinal Inguino-axillary   ANTERIOR []     []     []     []       POSTERIOR []     []     []     []       RIGHT []     []     []     []       LEFT []     []     []     []         Limbs:   []    RUE     []    LUE     [x]    RLE    [x]    LLE   ADL/Self Care: (15minutes): Pt was measured for Luigi Ales Wraps: size M, regular length and order was initiated with [facility]. Biagrip applied after treatment. She will bandage when she gets home. Treatment/Session Assessment:    · Response to Treatment:  Pt is compliant with home program and progressing towards goals. · Compliance with Program/Exercises: compliant to date. · Recommendations/Intent for next treatment session: \"Next visit will focus on complete decongestive therapy- reduction phase\".   Total Treatment Duration:60min  OT Patient Time In/Time Out  Time In: 1100  Time Out: 183 [facility] JEANMARIE Narvaez/L, JUANT

## 2022-02-28 NOTE — THERAPY DISCHARGE
Mandy Young  : 1965  Primary: Traci Mei Medicare Complete  Secondary:  [address]  at [place] [address] Therapy  [address], [address]  [phone]   [phone]           OUTPATIENT OCCUPATIONAL THERAPY: Discontinuation Summary 2022    ICD-10: Treatment Diagnosis: I89.0 lymphedema not elsewhere classifed, M79.609 pain in [id number] swelling of both LE's  Precautions/Allergies:   Sulfamethoxazole-trimethoprim   Fall Risk Score:    Ambulatory/Rehab Services H2 Model Falls Risk Assessment    Risk Factors:       No Risk Factors Identified Ability to Rise from Chair:       (1)  Pushes up, successful in one attempt    Falls Prevention Plan:       No modifications necessary   Total: (5 or greater = High Risk): 1     [facility] [employer]. All Rights Reserved. [facility] Patent #[id number]. Federal Law prohibits the replication, distribution or use without written permission from [employer]     MD Orders: eval and treat MEDICAL/REFERRING DIAGNOSIS:   Lymphedema, not elsewhere classified [I89.0]   DATE OF ONSET: chronic   REFERRING PHYSICIAN: Isai, [address] [employer] [facility],[address]: to be determined    Discontinuation Summary; Ms. Bhavna Gatica was seen for 2 OT sessions between 10-25-21 to 21 and was making progress towards OT goals. Overall total of circumferential measurements decreased by 9.5cm in the RLE and 13.5cm in the LLE. Brianne Sit were ordered for from [facility]. She did not return to therapy after she received Brianne Sit. Unplanned discharge from OT. INITIAL ASSESSMENT:  Ms. 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 Historically, she has been able to manage lymphedema by working out in the gym 3-4 days per week both gym and pool exercises.  However, her gym closed during the pandemic and she adopted a very sedentary lifestyle which caused weight gain. She tested positive for COVID a few weeks ago and continues to exhibit decreased endurance which has made her even more sedentary. Since COVID, LE pain and swelling have further increased. DVT was ruled out. She ordered compression knee highs but was unable to don them so she has been bandaging her legs with ACE wraps with assistance from her spouse. . She presents with lipolymphedema with adiposity and large lobules in the thigh region. Swelling is concentrated below knee with ankle cuff and sparing of the feet. She would benefit from skilled OT for complete decongestive therapy to decrease LE swelling, explore compression options and teach Ms. Amber Butcher how to manage condition at home. PLAN OF CARE:   PROBLEM LIST:  1. Increased Pain  2. Decreased Activity Tolerance  3. Edema/Girth  4. Decreased Skin Integrity/Hygeine  5. Decreased [place] with Home Exercise Program INTERVENTIONS PLANNED  1. Skin care  2. Compression bandaging  3. Fitting for compression garment(s)  4. Manual therapy/Manual lymph drainage  5. Therapeutic exercise/Therapeutic activities  6. Patient Education  7. Compression pump trial prn  8.  kinesiotaping    TREATMENT PLAN:  Effective Dates: 10/25/2021 to 1/23/2022 Frequency/Duration: 1x/week up to 90 days  Unplanned discharge from OT  GOALS: (Goals have been discussed and agreed upon with patient.)  Short-Term Functional Goals: Time Frame: 45 days  1. The patient/caregiver will verbalize understanding of lymphedema precautions. Goal met  2. Patient will be independent with skin care regimen to decrease risk of cellulitis. Goal met  3. The patient/caregiver will be independent at donning and doffing bilateral lower extremity compression bandages. Goal met  4. The patient/caregiver will be independent with self-manual lymph drainage techniques and show decrease in limb volume. Ongoing goal  5. Patient will be independent in lymphatic exercises.    Ongoing goal  Discharge Goals: Time Frame: 90 days  1. Patient's bilateral lower extremity circumferential measurements will decrease on volumetric graph by 5-7cmto maximize functional use in ADL's. Goal met  2. The patient/caregiver will be independent with home management of lymphedema. Ongoing goal  3. Patient/caregiver will be independent donning and doffing bilateral lower extremity compression garment. Ongoing goal      Regarding Anna Hauser's therapy, I certify that the treatment plan above will be carried out by a therapist or under their direction.   Thank you for this referral,  Yuni Soares, OTR/L, CLT

## 2022-03-19 PROBLEM — E66.01 OBESITY, MORBID (HCC): Status: ACTIVE | Noted: 2019-01-03

## 2022-08-31 ENCOUNTER — APPOINTMENT (RX ONLY)
Dept: URBAN - METROPOLITAN AREA CLINIC 24 | Facility: CLINIC | Age: 57
Setting detail: DERMATOLOGY
End: 2022-08-31

## 2022-08-31 DIAGNOSIS — L82.1 OTHER SEBORRHEIC KERATOSIS: ICD-10-CM

## 2022-08-31 DIAGNOSIS — I83.9 ASYMPTOMATIC VARICOSE VEINS OF LOWER EXTREMITIES: ICD-10-CM

## 2022-08-31 DIAGNOSIS — L57.8 OTHER SKIN CHANGES DUE TO CHRONIC EXPOSURE TO NONIONIZING RADIATION: ICD-10-CM

## 2022-08-31 DIAGNOSIS — Z71.89 OTHER SPECIFIED COUNSELING: ICD-10-CM

## 2022-08-31 DIAGNOSIS — Z87.2 PERSONAL HISTORY OF DISEASES OF THE SKIN AND SUBCUTANEOUS TISSUE: ICD-10-CM

## 2022-08-31 DIAGNOSIS — D22 MELANOCYTIC NEVI: ICD-10-CM

## 2022-08-31 PROBLEM — D22.5 MELANOCYTIC NEVI OF TRUNK: Status: ACTIVE | Noted: 2022-08-31

## 2022-08-31 PROBLEM — D22.71 MELANOCYTIC NEVI OF RIGHT LOWER LIMB, INCLUDING HIP: Status: ACTIVE | Noted: 2022-08-31

## 2022-08-31 PROBLEM — D22.39 MELANOCYTIC NEVI OF OTHER PARTS OF FACE: Status: ACTIVE | Noted: 2022-08-31

## 2022-08-31 PROBLEM — J30.1 ALLERGIC RHINITIS DUE TO POLLEN: Status: ACTIVE | Noted: 2022-08-31

## 2022-08-31 PROBLEM — I83.91 ASYMPTOMATIC VARICOSE VEINS OF RIGHT LOWER EXTREMITY: Status: ACTIVE | Noted: 2022-08-31

## 2022-08-31 PROCEDURE — 99213 OFFICE O/P EST LOW 20 MIN: CPT

## 2022-08-31 PROCEDURE — ? COUNSELING

## 2022-08-31 ASSESSMENT — LOCATION SIMPLE DESCRIPTION DERM
LOCATION SIMPLE: ABDOMEN
LOCATION SIMPLE: LEFT LOWER BACK
LOCATION SIMPLE: LEFT UPPER ARM
LOCATION SIMPLE: RIGHT PRETIBIAL REGION
LOCATION SIMPLE: RIGHT THIGH
LOCATION SIMPLE: RIGHT UPPER BACK
LOCATION SIMPLE: CHEST
LOCATION SIMPLE: LEFT CHEEK
LOCATION SIMPLE: RIGHT SHOULDER
LOCATION SIMPLE: RIGHT POSTERIOR THIGH
LOCATION SIMPLE: LEFT UPPER BACK

## 2022-08-31 ASSESSMENT — LOCATION ZONE DERM
LOCATION ZONE: LEG
LOCATION ZONE: ARM
LOCATION ZONE: TRUNK
LOCATION ZONE: FACE

## 2022-08-31 ASSESSMENT — LOCATION DETAILED DESCRIPTION DERM
LOCATION DETAILED: RIGHT INFERIOR UPPER BACK
LOCATION DETAILED: RIGHT DISTAL PRETIBIAL REGION
LOCATION DETAILED: LEFT MEDIAL MALAR CHEEK
LOCATION DETAILED: LEFT SUPERIOR MEDIAL MALAR CHEEK
LOCATION DETAILED: LEFT SUPERIOR LATERAL MALAR CHEEK
LOCATION DETAILED: RIGHT ANTERIOR SHOULDER
LOCATION DETAILED: LEFT INFERIOR LATERAL MIDBACK
LOCATION DETAILED: RIGHT SUPERIOR MEDIAL UPPER BACK
LOCATION DETAILED: LEFT MEDIAL SUPERIOR CHEST
LOCATION DETAILED: RIGHT RIB CAGE
LOCATION DETAILED: LEFT MID-UPPER BACK
LOCATION DETAILED: RIGHT ANTERIOR PROXIMAL THIGH
LOCATION DETAILED: LEFT ANTERIOR PROXIMAL UPPER ARM
LOCATION DETAILED: RIGHT DISTAL POSTERIOR THIGH

## 2022-10-27 ENCOUNTER — TRANSCRIBE ORDERS (OUTPATIENT)
Dept: SCHEDULING | Age: 57
End: 2022-10-27

## 2022-10-27 DIAGNOSIS — Z12.31 VISIT FOR SCREENING MAMMOGRAM: Primary | ICD-10-CM

## 2022-11-09 ENCOUNTER — HOSPITAL ENCOUNTER (OUTPATIENT)
Dept: MAMMOGRAPHY | Age: 57
Discharge: HOME | End: 2022-11-12
Payer: MEDICARE

## 2022-11-09 DIAGNOSIS — Z12.31 VISIT FOR SCREENING MAMMOGRAM: ICD-10-CM

## 2022-11-09 PROCEDURE — 77067 SCR MAMMO BI INCL CAD: CPT

## 2023-02-10 ENCOUNTER — OFFICE VISIT (OUTPATIENT)
Dept: ORTHOPEDIC SURGERY | Age: 58
End: 2023-02-10

## 2023-02-10 VITALS — WEIGHT: 293 LBS | HEIGHT: 66 IN | BODY MASS INDEX: 47.09 KG/M2

## 2023-02-10 DIAGNOSIS — M54.16 LUMBAR RADICULOPATHY: ICD-10-CM

## 2023-02-10 DIAGNOSIS — M54.50 LOW BACK PAIN, UNSPECIFIED BACK PAIN LATERALITY, UNSPECIFIED CHRONICITY, UNSPECIFIED WHETHER SCIATICA PRESENT: Primary | ICD-10-CM

## 2023-02-10 DIAGNOSIS — M51.36 LUMBAR DEGENERATIVE DISC DISEASE: ICD-10-CM

## 2023-02-10 RX ORDER — BUSPIRONE HYDROCHLORIDE 10 MG/1
TABLET ORAL
COMMUNITY
Start: 2022-11-09

## 2023-02-10 RX ORDER — DULOXETIN HYDROCHLORIDE 60 MG/1
60 CAPSULE, DELAYED RELEASE ORAL DAILY
COMMUNITY

## 2023-02-10 RX ORDER — TIZANIDINE 4 MG/1
2-4 TABLET ORAL EVERY 8 HOURS PRN
COMMUNITY
Start: 2022-02-01

## 2023-02-10 RX ORDER — GABAPENTIN 600 MG/1
600 TABLET ORAL NIGHTLY
Qty: 30 TABLET | Refills: 0 | Status: SHIPPED | OUTPATIENT
Start: 2023-02-10 | End: 2023-03-12

## 2023-02-10 RX ORDER — METHYLPREDNISOLONE 4 MG/1
TABLET ORAL
Qty: 1 KIT | Refills: 0 | Status: SHIPPED | OUTPATIENT
Start: 2023-02-10

## 2023-02-10 RX ORDER — BUPROPION HYDROCHLORIDE 150 MG/1
TABLET ORAL
COMMUNITY
Start: 2023-01-18

## 2023-02-10 RX ORDER — GABAPENTIN 300 MG/1
CAPSULE ORAL
COMMUNITY
Start: 2023-01-09

## 2023-02-10 NOTE — PROGRESS NOTES
Name: Leopoldo Jean  YOB: 1965  Gender: female  MRN: [id number]    CC: Left leg pain    HPI: This is a 62y.o. year old female who has had a 2-month history of pain in the left posterior lateral leg radiating down to the anterior knee and her shin. She had a history of spine surgery by Dr. Saintclair Hastings in 2009. She takes gabapentin 300 mg 3 times daily. She also takes tramadol and tizanidine. Pain is constant. She is in need of a total knee arthroplasty however is unable to do so due to her morbid obesity. She has had genicular nerve blocks and other avenues of treatment trying to treat her knee pain. The patient denies any change in bowel or bladder function since the onset of the symptoms. she  has had lumbar surgery in the past.      Thus far, the patient has tried muscle relaxers, gabapentin or lyrica, and physician directed home exercise program    Current pain level: Activities limited by pain:        AMB PAIN ASSESSMENT 2/10/2023   Location of Pain Leg   Location Modifiers Left   Severity of Pain 8   Frequency of Pain Constant   Limiting Behavior Yes   Result of Injury No   Work-Related Injury No   Are there other pain locations you wish to document? No            ROS/Meds/PSH/PMH/FH/SH: I personally reviewed the patient's collected intake data. Below are the pertinents:    Allergies   Allergen Reactions    Sulfamethoxazole-Trimethoprim Swelling         Current Outpatient Medications:     buPROPion (WELLBUTRIN XL) 150 MG extended release tablet, , Disp: , Rfl:     busPIRone (BUSPAR) 10 MG tablet, Taking 1/ 2 a day 5 mg, Disp: , Rfl:     gabapentin (NEURONTIN) 300 MG capsule, , Disp: , Rfl:     tiZANidine (ZANAFLEX) 4 MG tablet, Take 2-4 mg by mouth every 8 hours as needed, Disp: , Rfl:     DULoxetine (CYMBALTA) 60 MG extended release capsule, Take 60 mg by mouth daily Take bid, Disp: , Rfl:     methylPREDNISolone (MEDROL DOSEPACK) 4 MG tablet, Take by mouth as directed.  Start in morning, Disp: 1 kit, Rfl: 0    gabapentin (NEURONTIN) 600 MG tablet, Take 1 tablet by mouth nightly for 30 days. , Disp: 30 tablet, Rfl: 0    Calcium Carbonate (CALCIUM 500 PO), Take 1 tablet by mouth daily, Disp: , Rfl:     Cyanocobalamin (VITAMIN B-12 PO), Take 1,000 mg by mouth daily, Disp: , Rfl:     ferrous sulfate (IRON 325) 325 (65 Fe) MG tablet, Take 65 mg by mouth every morning (before breakfast), Disp: , Rfl:     hydroCHLOROthiazide (MICROZIDE) 12.5 MG capsule, Take 12.5 mg by mouth every morning, Disp: , Rfl:     traMADol (ULTRAM) 50 MG tablet, Take 50 mg by mouth every 6 hours as needed. , Disp: , Rfl:     Past Surgical History:   Procedure Laterality Date    BACK SURGERY      BLADDER SUSPENSION      CHOLECYSTECTOMY  2004         COLONOSCOPY      GASTRIC BYPASS SURGERY      GASTRIC BYPASS SURGERY  2004    loss about 200 lbs after surgery. HYSTERECTOMY (CERVIX STATUS UNKNOWN)  2011    urethra tube clipped during surgery and pt. had follow up bladder surgery. LAP,CHOLECYSTECTOMY      NEUROLOGICAL SURGERY  3/2009    lumbar herniated disc repair    ORTHOPEDIC SURGERY      total knee replacement-right       Patient Active Problem List   Diagnosis    Status post total knee replacement    Urine incontinence    Obesity, morbid (HCC)    Pelvic pain in female     Tobacco:  reports that she has never smoked. She has never used smokeless tobacco.  Alcohol:   Social History     Substance and Sexual Activity   Alcohol Use No          Physical Exam:   BMI: Body mass index is 61.37 kg/m². GENERAL:  Adult in no acute distress, severely obese Patient is appropriately conversant  MSK:  Examination of the lumbar spine reveals paraspinal tenderness    There is moderate tenderness to palpation along the spinous processes and paraspinal musculature. The patient ambulates with a unsteady gait. ROM of bilateral hip(s) reveals no irritability. NEURO:  Cranial nerves grossly intact. No motor deficits.     Straight leg testing is positive left  Sensory testing reveals intact sensation to light touch and in the distribution of the L3-S1 dermatomes bilaterally  Ankle jerk is negative for clonus    Reflexes   Right Left   Quadriceps (L4) 2 2   Achilles (S1) 2 2     Strength testing in the lower extremity reveals the following based on the 5 point grading scale:     HF (L2) H Ab (L5) KE (L3/4) ADF (L4) EHL (L5) A Ev (S1) APF (S1)   Right 5 5 5 5 5 5 5   Left 5 5 5 5 5 5 5     PSYCH:  Alert and oriented X 3. Appropriate affect. Intact judgment and insight. Radiographic Studies:     AP, lateral and spot views of the lumbar spine: There is severe multilevel advanced degenerative disc disease and arthrosis. Interpretation: Multilevel degenerative disc disease and severe facet arthropathy      MRI of Lumbar spine images independently reviewed: Amy Tinoco MD - 03/04/2022   Formatting of this note might be different from the original.     EXAM:  MRI Lumbar spine     COMPARISON:  None. INDICATION:  M54.41 Lumbago with sciatica, right side I10;G89.29 Other chronic pain I10;   8 - Indicated     TECHNICAL: Sagittal T1, sagittal T2, sagittal STIR, axial T1, and axial T2 sequences of the lumbar spine performed. FINDINGS:   Osseous structures:   Vertebral body height and marrow signal is normal. Alignment is normal.   Conus tip terminates atL1. No retroperitoneal mass nor lymphadenopathy present. Lower thoracic spine:  Unremarkable. L1-2:  Mild annular bulging with 2 mm retrolisthesis mild bilateral facet arthropathy. No significant central canal nor neural foraminal narrowing. L2-3:  No significant central canal nor neural foraminal narrowing. L3-4:  Mild bilateral facet arthropathy present. There is no significant central canal nor neural foraminal narrowing. L4-5:  Moderate bilateral facet arthropathy mild annular bulging small central disc protrusion. Endplate fatty deposition notable. There is mild bilateral neural foraminal narrowing. L5-S1:  Severe bilateral facet arthropathy present. Diffuse annular bulging is noted. There is a small central disc extrusion mild encroachment of the left lateral recess endplate osteophyte formation and facet arthropathy present there is associated moderate left and mild right neural foraminal narrowing. Sequelae of right L5 laminectomy. IMPRESSION:   1. In this patient with right radiculopathy, most notable right neural foraminal narrowing is at L4-5 and L5-S1 related to endplate osteophyte. Significant facet arthropathy is noted bilaterally in the lower lumbar spine postoperative changes of the right L5 lamina present. 2.  As above. Assessment/Plan:       Diagnosis Orders   1. Low back pain, unspecified back pain laterality, unspecified chronicity, unspecified whether sciatica present  XR LUMBAR SPINE (2-3 VIEWS)      2. Lumbar degenerative disc disease        3. Lumbar radiculopathy            This patient's clinical history and physical exam is consistent with a left  L-5 and S-1 lumbar radiculopathy. It is associated degenerative disc disease and foraminal stenosis. We will start some conservative care. If she does not improve with this then I would recommend trialing injections. We discussed unfortunately she is not a surgical candidate given her morbid obesity. We did discuss weight loss. We discussed the natural history of lumbar radiculopathy in that many of these patients have near complete resolution of their symptoms within eight to twelve weeks with conservative care. We discussed that conservative treatments typically start with activity modification, and medication followed by physical therapy as symptoms allow. Oral and/or epidural steroids are other options.  I also discussed potential surgical options if the symptoms fail to improve or there is a progressive neurologic deficit and conservative management has been exhausted. We discussed that surgery is not typically a reliable treatment for isolated back pain, but is usually very reliable in relieving buttock and leg symptoms.        - A home exercise program was prescribed for stretching and strengthening. A list of exercises was provided. - Oral Steroids: A short course of oral steroids was prescribed in an attempt to bring the acute radicular symptoms under control. The patient understands the risks and side effects of oral steroids including immunosuppression, hypertension, mood swings, increased blood sugar, including glaucoma. The steroid taper can be followed by NSAIDs once completed. - Neuropathic pain treatment: For neuropathic pain relief the patient was given a prescription and counseled regarding the possibility of risks and complications from this medication. She is good to take 300 mg twice daily and switch to 600 mg at night. 4 This is a chronic illness/condition with exacerbation and progression    Orders Placed This Encounter   Medications    methylPREDNISolone (MEDROL DOSEPACK) 4 MG tablet     Sig: Take by mouth as directed. Start in morning     Dispense:  1 kit     Refill:  0    gabapentin (NEURONTIN) 600 MG tablet     Sig: Take 1 tablet by mouth nightly for 30 days. Dispense:  30 tablet     Refill:  0        Orders Placed This Encounter   Procedures    XR LUMBAR SPINE (2-3 VIEWS)            Return in about 4 weeks (around 3/10/2023). ZION Caldwell - CNP  02/10/23      Elements of this note were created using speech recognition software. As such, errors of speech recognition may be present.

## 2023-02-14 ENCOUNTER — TELEPHONE (OUTPATIENT)
Dept: ORTHOPEDIC SURGERY | Age: 58
End: 2023-02-14

## 2023-02-14 NOTE — TELEPHONE ENCOUNTER
Spoke with patient and explained to her that per [facility] [employer] she can lower the dosage of Gabapentin back to 300mg.

## 2023-02-14 NOTE — TELEPHONE ENCOUNTER
Her gabapentin was recently increased and she is having a pain in the center of her stomach and headache and she is wondering if she needs to do something else. She did not take the medication last night or this morning.

## 2023-06-20 ENCOUNTER — OFFICE VISIT (OUTPATIENT)
Age: 58
End: 2023-06-20
Payer: COMMERCIAL

## 2023-06-20 VITALS
WEIGHT: 293 LBS | HEIGHT: 66 IN | HEART RATE: 104 BPM | BODY MASS INDEX: 47.09 KG/M2 | DIASTOLIC BLOOD PRESSURE: 88 MMHG | SYSTOLIC BLOOD PRESSURE: 146 MMHG

## 2023-06-20 DIAGNOSIS — I48.91 ATRIAL FIBRILLATION, UNSPECIFIED TYPE (HCC): ICD-10-CM

## 2023-06-20 DIAGNOSIS — Z76.89 ESTABLISHING CARE WITH NEW DOCTOR, ENCOUNTER FOR: Primary | ICD-10-CM

## 2023-06-20 DIAGNOSIS — R00.0 TACHYCARDIA: ICD-10-CM

## 2023-06-20 DIAGNOSIS — G47.33 OSA (OBSTRUCTIVE SLEEP APNEA): ICD-10-CM

## 2023-06-20 DIAGNOSIS — R06.09 DOE (DYSPNEA ON EXERTION): ICD-10-CM

## 2023-06-20 PROCEDURE — 99204 OFFICE O/P NEW MOD 45 MIN: CPT | Performed by: INTERNAL MEDICINE

## 2023-06-20 PROCEDURE — 93000 ELECTROCARDIOGRAM COMPLETE: CPT | Performed by: INTERNAL MEDICINE

## 2023-06-20 RX ORDER — MIRABEGRON 25 MG/1
TABLET, FILM COATED, EXTENDED RELEASE ORAL
COMMUNITY
Start: 2023-05-26

## 2023-06-20 RX ORDER — DILTIAZEM HYDROCHLORIDE 120 MG/1
120 CAPSULE, COATED, EXTENDED RELEASE ORAL DAILY
Qty: 30 CAPSULE | Refills: 3 | Status: SHIPPED | OUTPATIENT
Start: 2023-06-20

## 2023-06-20 RX ORDER — BUPRENORPHINE HYDROCHLORIDE 150 UG/1
FILM, SOLUBLE BUCCAL
COMMUNITY

## 2023-06-20 ASSESSMENT — ENCOUNTER SYMPTOMS
COUGH: 0
APHONIA: 0
EYE PAIN: 0
ABDOMINAL PAIN: 0
NAIL CHANGES: 0
STRIDOR: 0

## 2023-06-20 NOTE — PROGRESS NOTES
[address], [address] [facility], [address] [place] [address]  PHONE: [phone]    SUBJECTIVE:   Cortes Mancuso is a 62 y.o. female 1965   seen for a consultation visit regarding the following:     Chief Complaint   Patient presents with    New Patient    Establish Cardiologist              Consultation is requested by Pauline Max MD for evaluation of New Patient and Establish Cardiologist   .    History of present illness: 62 y.o. female presented for consultation 6/20/2023. Patient with previous history of cardiac catheterization in 2012 at [facility]. She presented for evaluation of worsening fatigue palpitations and concerns for atrial fibrillation. Patient has an Apple watch in place and is recorded several events of tachycardia occurring intermittently. Her device is registered with these as atrial fibrillation. Patient is a previous history of obstructive sleep apnea but discontinued after previous weight loss    Cardiac History:  2012 cardiac catheterization [facility] no obstructive coronary disease identified  6/20/2023 EKG sinus tachycardia nonspecific ST abnormalities    Assessment:  Tachycardia  Wearable device data reviewed with episodes of irregular heart rate concerning for atrial fibrillation. Cardiac monitor will be placed at this time  We discussed indications for anticoagulation therapy of atrial fibrillation was identified  [id number] Score 2 gender, hypertension. Start low-dose diltiazem extended release  Obstructive sleep apnea  Uncontrolled previously treated with CPAP now off therapy due to previous resolution of condition due to weight loss  Home sleep study ordered  Hypertension  Key CAD CHF Meds            dilTIAZem (CARDIZEM CD) 120 MG extended release capsule (Taking)    Sig - Route:  Take 1 capsule by mouth daily - Oral    hydroCHLOROthiazide (MICROZIDE) 12.5 MG capsule (Taking)    Class: Historical Med                Past Medical History,

## 2023-06-21 LAB
T4 FREE SERPL-MCNC: 0.9 NG/DL (ref 0.78–1.46)
TSH W FREE THYROID IF ABNORMAL: 4.2 UIU/ML (ref 0.36–3.74)

## 2023-07-02 ENCOUNTER — PATIENT MESSAGE (OUTPATIENT)
Age: 58
End: 2023-07-02

## 2023-07-03 ENCOUNTER — TELEPHONE (OUTPATIENT)
Age: 58
End: 2023-07-03

## 2023-07-03 NOTE — TELEPHONE ENCOUNTER
6/20/23  7 day holter monitor results not available per [employer]. Pt cut grass Saturday. Attentive to hydration  3a Sun a.m [place] fast HR, palpitations. Put Apple watch on.  -150 bpm.   Light headed, HR skipping. Denies weakness, sob. Remainder of Sun sx resolved. HR wnl. Not sleeping great in bed. Better in recliner. Informed pt monitor results not available yet. Monitor sx. /> return call to [facility] [address]. Hydrate with water, elevate feet. If sx persist 1 hr/>, proceed to ER. Fast HR can cause damage to heart muscle. Someone from [facility] is checking on Sleep Study. Advise return call or send message, prn. Pt voiced understanding and agreement with POC.   cgh

## 2023-07-19 ENCOUNTER — HOSPITAL ENCOUNTER (OUTPATIENT)
Dept: PHYSICAL THERAPY | Age: 58
Setting detail: RECURRING SERIES
Discharge: HOME | End: 2023-07-22
Payer: COMMERCIAL

## 2023-07-19 PROCEDURE — 97163 PT EVAL HIGH COMPLEX 45 MIN: CPT

## 2023-07-19 ASSESSMENT — PAIN SCALES - GENERAL: PAINLEVEL_OUTOF10: 0

## 2023-07-19 NOTE — THERAPY EVALUATION
6 weeks  Pt will demonstrate I with basic PFM HEP to improve awareness, coordination, and timing of PFM. Patient will demonstrate independence with basic pelvic floor muscle (PFM) home exercises program (HEP) to improve awareness, coordination, and timing of PFM. Patient will demonstrate understanding of and ability to teach back appropriate water intake, bladder irritants, toileting frequency, and positioning for improved self-management of symptoms. Patient will demonstrate ability to isolate a pelvic floor contraction without breath holding and minimal to no accessory muscle use in order to implement the knack and/or urge suppression, reducing pad usage by 1-2. Patient will demonstrate appropriate use of the pelvic floor muscle group (quick flicks and/or drops), without compensation, to implement urge suppression appropriately with urgency of urination and decrease the number of pads per day or UI episodes by 2-3. Discharge Goals: Time Frame: 12 weeks  Patient will improve outcome score by 40/100. Patient will be able to perform getting up at night without urinary leakage for improved participation in recreational activities and ADL's. Patient will demonstrate independence with an advanced HEP for general conditioning, core stabilization, and mobility to facilitate carry over and independent management of symptoms. Patient will be independent with implementation of a timed voiding schedule and use of urge suppression to reduce urinary frequency to once a night. Outcome Measure:   Pelvic Floor Impact Questionnaire--short form 7 (PFIQ-7):  Score:  Initial: 66.67  Bladder or Urine: 66.67/100  Bowel or Rectum: 0/100  Vagina or Pelvis: 0/100 Most Recent: X (Date: -- )  Bladder or Urine: X/100  Bowel or Rectum: X/100  Vagina or Pelvis: X/100   Interpretation of Score: Each of the 7 sections is scored on a scale from 0-3; 0 representing \"Not at all\", 3 representing \"Quite a bit\".  The mean value is

## 2023-07-19 NOTE — PROGRESS NOTES
Darrell Quiroz  : 1965  Primary: [employer])  Secondary:  [employer]  [address] Nadine Wright [address] [place] [address]  Phone: [phone]  Fax: [phone] Plan Frequency: 1x/wk for 6 weeks  Plan of Care/Certification Expiration Date: 10/17/23      >PT Visit Info:  Plan Frequency: 1x/wk for 6 weeks  Plan of Care/Certification Expiration Date: 10/17/23      Visit Count:  1    OUTPATIENT PHYSICAL THERAPY:OP NOTE TYPE: OP Note Type: Treatment Note 2023       Episode  }Appt Desk             Treatment Diagnosis:  Lack of coordination (muscle incoordination) (R27.8)  Pelvic floor dysfunction in female (M62.98)  Generalized weakness (M62.81)  Urge incontinence (N39.41)  Frequency of micturition (R35.0)  Nocturia (R35.1)  Rectocele (Prolapse of posterior vaginal wall) (N81.6)  Urgency of urination (R39.15)  Medical/Referring Diagnosis:  OAB (overactive bladder) [N32.81]  Referring Physician:  SYLVIA Reina MD Orders:  PT Eval and Treat   Date of Onset:  No data recorded   Allergies:   Sulfamethoxazole-trimethoprim  Restrictions/Precautions:  No data recordedNo data recorded     Interventions Planned (Treatment may consist of any combination of the following):    Current Treatment Recommendations: Strengthening; ROM; Endurance training; Neuromuscular re-education; Manual; Home exercise program; Safety education & training; Patient/Caregiver education & training; Equipment evaluation, education, & procurement; Positioning; Therapeutic activities     >Subjective Comments: See initial evaluation     >Initial:     010>Post Session:        /10  Medications Last Reviewed:  2023  Updated Objective Findings:  See evaluation note from today  Treatment   THERAPEUTIC EXERCISE: ( minutes):    Exercises per grid below to improve mobility, strength, and coordination.   Required minimal visual, verbal, manual, and tactile cues to promote proper body alignment, promote proper body

## 2023-07-20 ENCOUNTER — HOSPITAL ENCOUNTER (OUTPATIENT)
Dept: SLEEP CENTER | Age: 58
Discharge: HOME | End: 2023-07-23

## 2023-07-26 ENCOUNTER — HOSPITAL ENCOUNTER (OUTPATIENT)
Dept: PHYSICAL THERAPY | Age: 58
Setting detail: RECURRING SERIES
Discharge: HOME | End: 2023-07-29
Payer: COMMERCIAL

## 2023-07-26 PROCEDURE — 97110 THERAPEUTIC EXERCISES: CPT

## 2023-07-26 PROCEDURE — 97530 THERAPEUTIC ACTIVITIES: CPT

## 2023-07-26 PROCEDURE — 97140 MANUAL THERAPY 1/> REGIONS: CPT

## 2023-07-26 ASSESSMENT — PAIN SCALES - GENERAL: PAINLEVEL_OUTOF10: 0

## 2023-07-26 NOTE — PROGRESS NOTES
Sandyjonnaroberto Therese  : 1965  Primary: [employer])  Secondary:  [employer]  [address] [place] [address]  Phone: [phone]  Fax: [phone] Plan Frequency: 1x/wk for 6 weeks  Plan of Care/Certification Expiration Date: 10/17/23      >PT Visit Info:  Plan Frequency: 1x/wk for 6 weeks  Plan of Care/Certification Expiration Date: 10/17/23      Visit Count:  2    OUTPATIENT PHYSICAL THERAPY:OP NOTE TYPE: OP Note Type: Treatment Note 2023       Episode  }Appt Desk             Treatment Diagnosis:  Lack of coordination (muscle incoordination) (R27.8)  Pelvic floor dysfunction in female (M62.98)  Generalized weakness (M62.81)  Urge incontinence (N39.41)  Frequency of micturition (R35.0)  Nocturia (R35.1)  Rectocele (Prolapse of posterior vaginal wall) (N81.6)  Urgency of urination (R39.15)  Medical/Referring Diagnosis:  OAB (overactive bladder) [N32.81]  Referring Physician:  SYLVIA Cadet MD Orders:  PT Eval and Treat   Date of Onset:  No data recorded   Allergies:   Sulfamethoxazole-trimethoprim  Restrictions/Precautions:  No data recordedNo data recorded     Interventions Planned (Treatment may consist of any combination of the following):    Current Treatment Recommendations: Strengthening; ROM; Endurance training; Neuromuscular re-education; Manual; Home exercise program; Safety education & training; Patient/Caregiver education & training; Equipment evaluation, education, & procurement; Positioning; Therapeutic activities     >Subjective Comments: Pt reports she has been trying to do the drops and urge suppression. She is able to suppress her urge pretty well during the day but it's way too urgent at night. She does report reduced urgency at night if she remains supine instead of sitting up upon waking. Pt reports constipation this past week.      >Initial:     010>Post Session:        /10  Medications Last Reviewed:  2023  Updated Objective

## 2023-08-10 ENCOUNTER — OFFICE VISIT (OUTPATIENT)
Age: 58
End: 2023-08-10
Payer: COMMERCIAL

## 2023-08-10 VITALS
BODY MASS INDEX: 47.09 KG/M2 | WEIGHT: 293 LBS | HEART RATE: 80 BPM | SYSTOLIC BLOOD PRESSURE: 130 MMHG | DIASTOLIC BLOOD PRESSURE: 90 MMHG | HEIGHT: 66 IN

## 2023-08-10 DIAGNOSIS — R07.2 PRECORDIAL PAIN: Primary | ICD-10-CM

## 2023-08-10 PROCEDURE — 99214 OFFICE O/P EST MOD 30 MIN: CPT | Performed by: INTERNAL MEDICINE

## 2023-08-10 RX ORDER — NITROFURANTOIN MACROCRYSTALS 100 MG/1
100 CAPSULE ORAL 2 TIMES DAILY
COMMUNITY

## 2023-08-10 RX ORDER — DILTIAZEM HYDROCHLORIDE 240 MG/1
240 CAPSULE, COATED, EXTENDED RELEASE ORAL DAILY
Qty: 30 CAPSULE | Refills: 3 | Status: SHIPPED | OUTPATIENT
Start: 2023-08-10

## 2023-08-10 ASSESSMENT — ENCOUNTER SYMPTOMS
STRIDOR: 0
APHONIA: 0
EYE PAIN: 0
NAIL CHANGES: 0
COUGH: 0
ABDOMINAL PAIN: 0

## 2023-08-10 NOTE — PROGRESS NOTES
[address] [facility], [address]  PHONE: [phone]    SUBJECTIVE:   Rafael Yang is a 62 y.o. female 1965   seen for a consultation visit regarding the following:     Chief Complaint   Patient presents with    Results    Tachycardia    Atrial Fibrillation        History of present illness: 62 y.o. female here for monitoring results cardiac monitor placed irregular heart rhythm lasting 1.1-minute interpreted as A-fib    Interval history:   presented for consultation 6/20/2023. Patient with previous history of cardiac catheterization in 2012 at [facility]. She presented for evaluation of worsening fatigue palpitations and concerns for atrial fibrillation. Patient has an Apple watch in place and is recorded several events of tachycardia occurring intermittently. Her device is registered with these as atrial fibrillation. Patient is a previous history of obstructive sleep apnea but discontinued after previous weight loss    Cardiac History:  2012 cardiac catheterization [facility] no obstructive coronary disease identified  6/20/2023 EKG sinus tachycardia nonspecific ST abnormalities  7/19/2023 echocardiogram ejection fraction 55% no major valvular abnormalities normal diastolic function    Assessment:  Tachycardia  Cardiac monitor with 1 event of possible atrial fibrillation atrial tachyarrhythmia lasting 1.1-minutes  [id number] Score 2 gender, hypertension. Start low-dose diltiazem extended release  Obstructive sleep apnea  Has daisy plans for CPAP titration   Hypertension  Key CAD CHF Meds            dilTIAZem (CARDIZEM CD) 240 MG extended release capsule (Taking)    Sig - Route: Take 1 capsule by mouth daily - Oral    apixaban (ELIQUIS) 5 MG TABS tablet (Taking)    Sig - Route:  Take 1 tablet by mouth 2 times daily - Oral    hydroCHLOROthiazide (MICROZIDE) 12.5 MG capsule (Taking)    Class: Historical Med                Past Medical History, Past

## 2023-08-24 ENCOUNTER — APPOINTMENT (OUTPATIENT)
Dept: GENERAL RADIOLOGY | Age: 58
End: 2023-08-24
Payer: MEDICARE

## 2023-08-24 ENCOUNTER — HOSPITAL ENCOUNTER (EMERGENCY)
Age: 58
Discharge: HOME | End: 2023-08-24
Attending: EMERGENCY MEDICINE
Payer: MEDICARE

## 2023-08-24 ENCOUNTER — TELEPHONE (OUTPATIENT)
Age: 58
End: 2023-08-24

## 2023-08-24 VITALS
WEIGHT: 293 LBS | SYSTOLIC BLOOD PRESSURE: 119 MMHG | DIASTOLIC BLOOD PRESSURE: 59 MMHG | HEART RATE: 72 BPM | BODY MASS INDEX: 48.82 KG/M2 | HEIGHT: 65 IN | OXYGEN SATURATION: 99 % | RESPIRATION RATE: 18 BRPM | TEMPERATURE: 98 F

## 2023-08-24 DIAGNOSIS — I48.91 ATRIAL FIBRILLATION WITH RAPID VENTRICULAR RESPONSE (HCC): Primary | ICD-10-CM

## 2023-08-24 LAB
ANION GAP SERPL CALC-SCNC: 3 MMOL/L (ref 2–11)
BASOPHILS # BLD: 0.1 K/UL (ref 0–0.2)
BASOPHILS NFR BLD: 1 % (ref 0–2)
BUN SERPL-MCNC: 10 MG/DL (ref 6–23)
CALCIUM SERPL-MCNC: 9.8 MG/DL (ref 8.3–10.4)
CHLORIDE SERPL-SCNC: 107 MMOL/L (ref 101–110)
CO2 SERPL-SCNC: 32 MMOL/L (ref 21–32)
CREAT SERPL-MCNC: 0.8 MG/DL (ref 0.6–1)
DIFFERENTIAL METHOD BLD: ABNORMAL
EKG DIAGNOSIS: NORMAL
EKG Q-T INTERVAL: 302 MS
EKG QRS DURATION: 64 MS
EKG QTC CALCULATION (BAZETT): 426 MS
EKG R AXIS: 53 DEGREES
EKG T AXIS: 54 DEGREES
EKG VENTRICULAR RATE: 120 BPM
EOSINOPHIL # BLD: 0.1 K/UL (ref 0–0.8)
EOSINOPHIL NFR BLD: 1 % (ref 0.5–7.8)
ERYTHROCYTE [DISTWIDTH] IN BLOOD BY AUTOMATED COUNT: 13.6 % (ref 11.9–14.6)
GLUCOSE SERPL-MCNC: 153 MG/DL (ref 65–100)
HCT VFR BLD AUTO: 48.6 % (ref 35.8–46.3)
HGB BLD-MCNC: 15.2 G/DL (ref 11.7–15.4)
IMM GRANULOCYTES # BLD AUTO: 0.1 K/UL (ref 0–0.5)
IMM GRANULOCYTES NFR BLD AUTO: 1 % (ref 0–5)
LYMPHOCYTES # BLD: 2.6 K/UL (ref 0.5–4.6)
LYMPHOCYTES NFR BLD: 37 % (ref 13–44)
MAGNESIUM SERPL-MCNC: 2.1 MG/DL (ref 1.8–2.4)
MCH RBC QN AUTO: 30.3 PG (ref 26.1–32.9)
MCHC RBC AUTO-ENTMCNC: 31.3 G/DL (ref 31.4–35)
MCV RBC AUTO: 96.8 FL (ref 82–102)
MONOCYTES # BLD: 0.4 K/UL (ref 0.1–1.3)
MONOCYTES NFR BLD: 6 % (ref 4–12)
NEUTS SEG # BLD: 3.9 K/UL (ref 1.7–8.2)
NEUTS SEG NFR BLD: 54 % (ref 43–78)
NRBC # BLD: 0 K/UL (ref 0–0.2)
PLATELET # BLD AUTO: 265 K/UL (ref 150–450)
PMV BLD AUTO: 10.2 FL (ref 9.4–12.3)
POTASSIUM SERPL-SCNC: 4.4 MMOL/L (ref 3.5–5.1)
RBC # BLD AUTO: 5.02 M/UL (ref 4.05–5.2)
SODIUM SERPL-SCNC: 142 MMOL/L (ref 133–143)
TROPONIN I SERPL HS-MCNC: 17.1 PG/ML (ref 0–14)
TROPONIN I SERPL HS-MCNC: 19.7 PG/ML (ref 0–14)
WBC # BLD AUTO: 7.1 K/UL (ref 4.3–11.1)

## 2023-08-24 PROCEDURE — 93005 ELECTROCARDIOGRAM TRACING: CPT | Performed by: EMERGENCY MEDICINE

## 2023-08-24 PROCEDURE — 85025 COMPLETE CBC W/AUTO DIFF WBC: CPT

## 2023-08-24 PROCEDURE — 2500000003 HC RX 250 WO HCPCS: Performed by: EMERGENCY MEDICINE

## 2023-08-24 PROCEDURE — 83735 ASSAY OF MAGNESIUM: CPT

## 2023-08-24 PROCEDURE — 84484 ASSAY OF TROPONIN QUANT: CPT

## 2023-08-24 PROCEDURE — 99285 EMERGENCY DEPT VISIT HI MDM: CPT

## 2023-08-24 PROCEDURE — 71046 X-RAY EXAM CHEST 2 VIEWS: CPT

## 2023-08-24 PROCEDURE — 80048 BASIC METABOLIC PNL TOTAL CA: CPT

## 2023-08-24 PROCEDURE — 93010 ELECTROCARDIOGRAM REPORT: CPT | Performed by: INTERNAL MEDICINE

## 2023-08-24 RX ORDER — METOPROLOL TARTRATE 5 MG/5ML
5 INJECTION INTRAVENOUS
Status: DISCONTINUED | OUTPATIENT
Start: 2023-08-24 | End: 2023-08-24 | Stop reason: HOSPADM

## 2023-08-24 ASSESSMENT — PAIN SCALES - GENERAL: PAINLEVEL_OUTOF10: 2

## 2023-08-24 ASSESSMENT — PAIN - FUNCTIONAL ASSESSMENT
PAIN_FUNCTIONAL_ASSESSMENT: NONE - DENIES PAIN
PAIN_FUNCTIONAL_ASSESSMENT: 0-10

## 2023-08-24 ASSESSMENT — PAIN DESCRIPTION - LOCATION: LOCATION: CHEST

## 2023-08-24 ASSESSMENT — ENCOUNTER SYMPTOMS
RESPIRATORY NEGATIVE: 1
GASTROINTESTINAL NEGATIVE: 1
BACK PAIN: 0

## 2023-08-24 NOTE — TELEPHONE ENCOUNTER
Pt called back and states she has been in AFIB since 4 am this morning but has no other symptoms and wants to know which ER she should go to, was advised if she wanted to see one of our doctors she could go to [facility] but that is her choice.

## 2023-08-24 NOTE — TELEPHONE ENCOUNTER
----- Message from Venessa Canchola MD sent at 8/24/2023  7:03 AM EDT -----  Has an abnormal stress test please offer sooner appointment

## 2023-08-24 NOTE — ED PROVIDER NOTES
This software is not perfect and grammatical and other typographical errors may be present. This note has not been completely proofread for errors.      Live Montes MD  08/24/23 [id number]

## 2023-08-24 NOTE — ED TRIAGE NOTES
Patient ambulatory to triage with CO fast HR. Pt had 2 day stress test 8/21 and 8/22 with abnormal results. Pt reports over the night last night had HR 170s, sent by cardiology. Pt reports SOB and chest pressure. Hx AF compliant with eliquis and recent increase in Cardizem.

## 2023-08-24 NOTE — TELEPHONE ENCOUNTER
Called pt and relayed Dr. Janette Maldonado message. Scheduled follow up for 08/25. Pt voiced that she has had feelings of afib this morning and was considering going to the ER.

## 2023-08-25 ENCOUNTER — OFFICE VISIT (OUTPATIENT)
Age: 58
End: 2023-08-25
Payer: MEDICARE

## 2023-08-25 VITALS
WEIGHT: 293 LBS | HEIGHT: 65 IN | DIASTOLIC BLOOD PRESSURE: 80 MMHG | BODY MASS INDEX: 48.82 KG/M2 | HEART RATE: 82 BPM | SYSTOLIC BLOOD PRESSURE: 130 MMHG

## 2023-08-25 DIAGNOSIS — I10 HYPERTENSION, ESSENTIAL: ICD-10-CM

## 2023-08-25 DIAGNOSIS — I48.91 ATRIAL FIBRILLATION, UNSPECIFIED TYPE (HCC): Primary | ICD-10-CM

## 2023-08-25 DIAGNOSIS — R94.39 ABNORMAL STRESS TEST: ICD-10-CM

## 2023-08-25 DIAGNOSIS — G47.33 OSA (OBSTRUCTIVE SLEEP APNEA): ICD-10-CM

## 2023-08-25 PROCEDURE — G8427 DOCREV CUR MEDS BY ELIG CLIN: HCPCS | Performed by: INTERNAL MEDICINE

## 2023-08-25 PROCEDURE — 3017F COLORECTAL CA SCREEN DOC REV: CPT | Performed by: INTERNAL MEDICINE

## 2023-08-25 PROCEDURE — 1036F TOBACCO NON-USER: CPT | Performed by: INTERNAL MEDICINE

## 2023-08-25 PROCEDURE — G8417 CALC BMI ABV UP PARAM F/U: HCPCS | Performed by: INTERNAL MEDICINE

## 2023-08-25 PROCEDURE — 3075F SYST BP GE 130 - 139MM HG: CPT | Performed by: INTERNAL MEDICINE

## 2023-08-25 PROCEDURE — 3079F DIAST BP 80-89 MM HG: CPT | Performed by: INTERNAL MEDICINE

## 2023-08-25 PROCEDURE — 99214 OFFICE O/P EST MOD 30 MIN: CPT | Performed by: INTERNAL MEDICINE

## 2023-08-25 RX ORDER — DILTIAZEM HYDROCHLORIDE 360 MG/1
360 CAPSULE, EXTENDED RELEASE ORAL DAILY
Qty: 30 CAPSULE | Refills: 3 | Status: SHIPPED | OUTPATIENT
Start: 2023-08-25

## 2023-08-25 ASSESSMENT — ENCOUNTER SYMPTOMS
STRIDOR: 0
EYE PAIN: 0
COUGH: 0
APHONIA: 0
NAIL CHANGES: 0
ABDOMINAL PAIN: 0

## 2023-08-27 ENCOUNTER — HOSPITAL ENCOUNTER (OUTPATIENT)
Dept: SLEEP MEDICINE | Age: 58
Discharge: HOME | End: 2023-08-30
Payer: MEDICARE

## 2023-08-27 PROCEDURE — 95811 POLYSOM 6/>YRS CPAP 4/> PARM: CPT

## 2023-08-28 PROBLEM — R06.00 DYSPNEA: Status: ACTIVE | Noted: 2023-08-28

## 2023-08-30 ENCOUNTER — HOSPITAL ENCOUNTER (OUTPATIENT)
Age: 58
Setting detail: OUTPATIENT SURGERY
Discharge: HOME | End: 2023-08-30
Attending: INTERNAL MEDICINE | Admitting: INTERNAL MEDICINE
Payer: MEDICARE

## 2023-08-30 VITALS
WEIGHT: 293 LBS | BODY MASS INDEX: 48.82 KG/M2 | HEIGHT: 65 IN | DIASTOLIC BLOOD PRESSURE: 64 MMHG | TEMPERATURE: 97.8 F | OXYGEN SATURATION: 95 % | HEART RATE: 67 BPM | SYSTOLIC BLOOD PRESSURE: 119 MMHG | RESPIRATION RATE: 18 BRPM

## 2023-08-30 DIAGNOSIS — R06.00 DYSPNEA, UNSPECIFIED TYPE: Primary | ICD-10-CM

## 2023-08-30 DIAGNOSIS — R06.00 DYSPNEA: ICD-10-CM

## 2023-08-30 LAB
ECHO BSA: 2.8 M2
EKG ATRIAL RATE: 69 BPM
EKG DIAGNOSIS: NORMAL
EKG P AXIS: 64 DEGREES
EKG P-R INTERVAL: 160 MS
EKG Q-T INTERVAL: 444 MS
EKG QRS DURATION: 88 MS
EKG QTC CALCULATION (BAZETT): 475 MS
EKG R AXIS: 31 DEGREES
EKG T AXIS: 46 DEGREES
EKG VENTRICULAR RATE: 69 BPM

## 2023-08-30 PROCEDURE — 93460 R&L HRT ART/VENTRICLE ANGIO: CPT | Performed by: INTERNAL MEDICINE

## 2023-08-30 PROCEDURE — 93005 ELECTROCARDIOGRAM TRACING: CPT | Performed by: INTERNAL MEDICINE

## 2023-08-30 PROCEDURE — C1769 GUIDE WIRE: HCPCS | Performed by: INTERNAL MEDICINE

## 2023-08-30 PROCEDURE — 6360000002 HC RX W HCPCS: Performed by: INTERNAL MEDICINE

## 2023-08-30 PROCEDURE — 2580000003 HC RX 258: Performed by: INTERNAL MEDICINE

## 2023-08-30 PROCEDURE — C1751 CATH, INF, PER/CENT/MIDLINE: HCPCS | Performed by: INTERNAL MEDICINE

## 2023-08-30 PROCEDURE — C1894 INTRO/SHEATH, NON-LASER: HCPCS | Performed by: INTERNAL MEDICINE

## 2023-08-30 PROCEDURE — 2709999900 HC NON-CHARGEABLE SUPPLY: Performed by: INTERNAL MEDICINE

## 2023-08-30 PROCEDURE — 6360000004 HC RX CONTRAST MEDICATION: Performed by: INTERNAL MEDICINE

## 2023-08-30 PROCEDURE — 99152 MOD SED SAME PHYS/QHP 5/>YRS: CPT | Performed by: INTERNAL MEDICINE

## 2023-08-30 PROCEDURE — 93010 ELECTROCARDIOGRAM REPORT: CPT | Performed by: INTERNAL MEDICINE

## 2023-08-30 PROCEDURE — 2500000003 HC RX 250 WO HCPCS: Performed by: INTERNAL MEDICINE

## 2023-08-30 RX ORDER — HEPARIN SODIUM 200 [USP'U]/100ML
INJECTION, SOLUTION INTRAVENOUS CONTINUOUS PRN
Status: COMPLETED | OUTPATIENT
Start: 2023-08-30 | End: 2023-08-30

## 2023-08-30 RX ORDER — LIDOCAINE HYDROCHLORIDE 10 MG/ML
INJECTION, SOLUTION INFILTRATION; PERINEURAL PRN
Status: DISCONTINUED | OUTPATIENT
Start: 2023-08-30 | End: 2023-08-30 | Stop reason: HOSPADM

## 2023-08-30 RX ORDER — SODIUM CHLORIDE 9 MG/ML
INJECTION, SOLUTION INTRAVENOUS CONTINUOUS
Status: DISCONTINUED | OUTPATIENT
Start: 2023-08-30 | End: 2023-08-30 | Stop reason: HOSPADM

## 2023-08-30 RX ORDER — SODIUM CHLORIDE 9 MG/ML
INJECTION, SOLUTION INTRAVENOUS PRN
OUTPATIENT
Start: 2023-08-30

## 2023-08-30 RX ORDER — SODIUM CHLORIDE 0.9 % (FLUSH) 0.9 %
5-40 SYRINGE (ML) INJECTION PRN
OUTPATIENT
Start: 2023-08-30

## 2023-08-30 RX ORDER — ACETAMINOPHEN 325 MG/1
650 TABLET ORAL EVERY 4 HOURS PRN
OUTPATIENT
Start: 2023-08-30

## 2023-08-30 RX ORDER — ASPIRIN 81 MG/1
324 TABLET, CHEWABLE ORAL ONCE
Status: DISCONTINUED | OUTPATIENT
Start: 2023-08-30 | End: 2023-08-30 | Stop reason: HOSPADM

## 2023-08-30 RX ORDER — MIDAZOLAM HYDROCHLORIDE 1 MG/ML
INJECTION INTRAMUSCULAR; INTRAVENOUS PRN
Status: DISCONTINUED | OUTPATIENT
Start: 2023-08-30 | End: 2023-08-30 | Stop reason: HOSPADM

## 2023-08-30 RX ORDER — SODIUM CHLORIDE 0.9 % (FLUSH) 0.9 %
5-40 SYRINGE (ML) INJECTION EVERY 12 HOURS SCHEDULED
OUTPATIENT
Start: 2023-08-30

## 2023-08-30 RX ADMIN — SODIUM CHLORIDE: 900 INJECTION, SOLUTION INTRAVENOUS at 06:49

## 2023-08-30 NOTE — DISCHARGE INSTRUCTIONS
HEART CATHETERIZATION/ANGIOGRAPHY DISCHARGE INSTRUCTIONS    Check puncture site frequently for swelling or bleeding. If there is any bleeding, apply pressure over the area with a clean towel or washcloth and call 911. Notify your doctor for any redness, swelling, drainage, or oozing from the puncture site. Notify your doctor for any fever or chills. If the extremity becomes cold, numb, or painful call [facility] at [phone]. Activity should be limited for the next 48 hours. No heavy lifting, pushing, pulling  or strenuous activity for 48 hours. No heavy lifting (anything over 10 pounds) for 3 days. You may resume your usual diet. Drink more fluids than usual.  Have a responsible person drive you home and stay with you for at least 24 hours after your heart catheterization/angiography. You may remove bandage from your right arm in 24 hours. You may shower in 24 hours. No tub baths, hot tubs, or swimming for 1 week. Do not place any lotions, creams, powders, or ointments over puncture site for 1 week. You may place a clean band-aid over the puncture site each day for 5 days. Change daily. Sedation for a Medical Procedure: Care Instructions     You were given a sedative medication during your visit. While many of the effects will have worn   off before you leave; you may continue to feel some effects for several hours. Common side effects from sedation include:  Feeling sleepy. (Your doctors and nurses will make sure you are not too sleepy to go home.)  Nausea and vomiting. This usually does not last long. Feeling tired. How can you care for yourself at home? Activity    Don't do anything for 24 hours that requires attention to detail. It takes time for the medicine effects to completely wear off. Do not make important legal decisions for 24 hours. Do not sign any legal documents for 24 hours.      Do not drink alcohol today     For your safety, you should not drive or operate heavy

## 2023-08-30 NOTE — PROGRESS NOTES
7FR venous sheath removed from right brachial using sterile technique. Manual pressure held over SITE FOR 10 minutes. Hemostasis achieved. Right brachial without bleeding without hematoma. Sterile gauze placed over site and secured with tegaderm. Patient instructed to keep right arm straight and still. Patient verbalizes understanding.
Assisted OOB & ambulated to BR & on unit. Tolerated activity without difficulty.  Right wrist without bleeding or hematoma
Assisted patient with charlene.
Discharge instructions given per orders, voiced good understanding of post procedure care, medications & follow up care.  Denies any questions
Food tray given to patient.
Patient discharged from hospital by RN Via wheelchair.
Patient pre-assessment complete for [facility] scheduled for 23, arrival time 0600. Patient verified using . Patient instructed to bring a list of all home medications on the day of procedure. NPO status reinforced. Patient informed to take a full dose aspirin 325mg  or 81 mg x 4 on the day of procedure. Patient instructed to HOLD hctz. Instructed they can take all other medications excluding vitamins & supplements. Patient verbalizes understanding of all instructions & denies any questions at this time.
Patient received to [facility] room # 11  Ambulatory from [facility]. Patient scheduled for [facility] today with Dr Mahendra Israel. Procedure reviewed & questions answered, voiced good understanding consent obtained & placed on chart. All medications and medical history reviewed. Will prep patient per orders. Patient & family updated on plan of care. The patient has a fraility score of 3-MANAGING WELL, based on ambulation.     324mg of Aspirin taken at [facility]
R band deflation completed. Right radial dressed with gauze and tegaderm using sterile technique. No bleeding or hematoma.  Tolerated without difficulty
TRANSFER - IN REPORT:    Verbal report received from [facility] on Darrell Quiroz  being received from cath lab for routine progression of patient care      Report consisted of patient's Situation, Background, Assessment and   Recommendations(SBAR). Information from the following report(s) Nurse Handoff Report was reviewed with the receiving nurse. Opportunity for questions and clarification was provided. Assessment completed upon patient's arrival to unit and care assumed.
TRANSFER - OUT REPORT:    Verbal report given to Lio Rodgers RN on Alexandra Patterson  being transferred to [facility] for routine progression of patient care       Report consisted of patient's Situation, Background, Assessment and   Recommendations(SBAR). Information from the following report(s) Nurse Handoff Report was reviewed with the receiving nurse.            C/RHC with Dr Mahendra Israel  No interventions  R Radial  TR band at 12mL  R Brachial  7fr sheath left in place  Covered with tegaderm  Versed 2mg  Fentanyl 50mcg  Heparin 5000 units
left upper arm

## 2023-09-14 ENCOUNTER — OFFICE VISIT (OUTPATIENT)
Age: 58
End: 2023-09-14
Payer: MEDICARE

## 2023-09-14 VITALS
DIASTOLIC BLOOD PRESSURE: 74 MMHG | BODY MASS INDEX: 48.82 KG/M2 | HEIGHT: 65 IN | SYSTOLIC BLOOD PRESSURE: 120 MMHG | HEART RATE: 84 BPM | WEIGHT: 293 LBS

## 2023-09-14 DIAGNOSIS — M25.531 RIGHT WRIST PAIN: ICD-10-CM

## 2023-09-14 DIAGNOSIS — G47.33 OSA (OBSTRUCTIVE SLEEP APNEA): ICD-10-CM

## 2023-09-14 DIAGNOSIS — I48.91 ATRIAL FIBRILLATION, UNSPECIFIED TYPE (HCC): Primary | ICD-10-CM

## 2023-09-14 PROCEDURE — G8417 CALC BMI ABV UP PARAM F/U: HCPCS | Performed by: INTERNAL MEDICINE

## 2023-09-14 PROCEDURE — 3017F COLORECTAL CA SCREEN DOC REV: CPT | Performed by: INTERNAL MEDICINE

## 2023-09-14 PROCEDURE — 1036F TOBACCO NON-USER: CPT | Performed by: INTERNAL MEDICINE

## 2023-09-14 PROCEDURE — 99214 OFFICE O/P EST MOD 30 MIN: CPT | Performed by: INTERNAL MEDICINE

## 2023-09-14 PROCEDURE — G8427 DOCREV CUR MEDS BY ELIG CLIN: HCPCS | Performed by: INTERNAL MEDICINE

## 2023-09-14 RX ORDER — CIPROFLOXACIN 500 MG/1
TABLET, FILM COATED ORAL
COMMUNITY
Start: 2023-09-05

## 2023-09-14 RX ORDER — ONDANSETRON 4 MG/1
4 TABLET, FILM COATED ORAL EVERY 8 HOURS PRN
COMMUNITY

## 2023-09-14 ASSESSMENT — ENCOUNTER SYMPTOMS
NAIL CHANGES: 0
EYE PAIN: 0
STRIDOR: 0
COUGH: 0
ABDOMINAL PAIN: 0
APHONIA: 0

## 2023-09-14 NOTE — PROGRESS NOTES
110 bpm    Stress Rate Pressure Product 13,200 bpm*mmHg    Stress Percent HR Achieved 68 %    Body Surface Area 2.83 m2    Nuc Stress EF 66 %   EKG 12 Lead    Collection Time: 08/24/23  1:12 PM   Result Value Ref Range    Ventricular Rate 120 BPM    QRS Duration 64 ms    Q-T Interval 302 ms    QTc Calculation (Bazett) 426 ms    R Axis 53 degrees    T Axis 54 degrees    Diagnosis       Atrial fibrillation with rapid ventricular response  Anterior infarct , age undetermined  Abnormal ECG  When compared with ECG of 24-AUG-2005 13:17,  Atrial fibrillation has replaced Sinus rhythm  Vent.  rate has increased BY  60 BPM  Anterior infarct is now Present  Nonspecific T wave abnormality now evident in Lateral leads  Confirmed by MD CHIVO, Jo Regalado ([id number]) on 8/24/2023 4:23:08 PM     Basic Metabolic Panel    Collection Time: 08/24/23  2:00 PM   Result Value Ref Range    Sodium 142 133 - 143 mmol/L    Potassium 4.4 3.5 - 5.1 mmol/L    Chloride 107 101 - 110 mmol/L    CO2 32 21 - 32 mmol/L    Anion Gap 3 2 - 11 mmol/L    Glucose 153 (H) 65 - 100 mg/dL    BUN 10 6 - 23 MG/DL    Creatinine 0.80 0.6 - 1.0 MG/DL    Est, Glom Filt Rate >60 >60 ml/min/1.73m2    Calcium 9.8 8.3 - 10.4 MG/DL   CBC with Auto Differential    Collection Time: 08/24/23  2:00 PM   Result Value Ref Range    WBC 7.1 4.3 - 11.1 K/uL    RBC 5.02 4.05 - 5.2 M/uL    Hemoglobin 15.2 11.7 - 15.4 g/dL    Hematocrit 48.6 (H) 35.8 - 46.3 %    MCV 96.8 82 - 102 FL    MCH 30.3 26.1 - 32.9 PG    MCHC 31.3 (L) 31.4 - 35.0 g/dL    RDW 13.6 11.9 - 14.6 %    Platelets 667 253 - 721 K/uL    MPV 10.2 9.4 - 12.3 FL    nRBC 0.00 0.0 - 0.2 K/uL    Differential Type AUTOMATED      Neutrophils % 54 43 - 78 %    Lymphocytes % 37 13 - 44 %    Monocytes % 6 4.0 - 12.0 %    Eosinophils % 1 0.5 - 7.8 %    Basophils % 1 0.0 - 2.0 %    Immature Granulocytes 1 0.0 - 5.0 %    Neutrophils Absolute 3.9 1.7 - 8.2 K/UL    Lymphocytes Absolute 2.6 0.5 - 4.6 K/UL    Monocytes Absolute 0.4 0.1 - 1.3

## 2023-09-15 RX ORDER — APIXABAN 5 MG/1
5 TABLET, FILM COATED ORAL 2 TIMES DAILY
Qty: 180 TABLET | Refills: 3 | Status: SHIPPED | OUTPATIENT
Start: 2023-09-15

## 2023-09-15 NOTE — TELEPHONE ENCOUNTER
Requested Prescriptions     Pending Prescriptions Disp Refills    ELIQUIS 5 MG TABS tablet [Pharmacy Med Name: ELIQUIS 5MG TABLETS] 180 tablet 3     Sig: TAKE 1 TABLET BY MOUTH TWICE DAILY

## 2023-09-19 ENCOUNTER — OFFICE VISIT (OUTPATIENT)
Dept: VASCULAR SURGERY | Age: 58
End: 2023-09-19
Payer: MEDICARE

## 2023-09-19 VITALS
DIASTOLIC BLOOD PRESSURE: 73 MMHG | BODY MASS INDEX: 48.82 KG/M2 | OXYGEN SATURATION: 100 % | HEART RATE: 67 BPM | WEIGHT: 293 LBS | SYSTOLIC BLOOD PRESSURE: 135 MMHG | HEIGHT: 65 IN

## 2023-09-19 DIAGNOSIS — I73.9 PVD (PERIPHERAL VASCULAR DISEASE) WITH CLAUDICATION (HCC): Primary | ICD-10-CM

## 2023-09-19 PROCEDURE — 99204 OFFICE O/P NEW MOD 45 MIN: CPT | Performed by: SURGERY

## 2023-09-19 PROCEDURE — 1036F TOBACCO NON-USER: CPT | Performed by: SURGERY

## 2023-09-19 PROCEDURE — G8427 DOCREV CUR MEDS BY ELIG CLIN: HCPCS | Performed by: SURGERY

## 2023-09-19 PROCEDURE — G8417 CALC BMI ABV UP PARAM F/U: HCPCS | Performed by: SURGERY

## 2023-09-19 PROCEDURE — 3017F COLORECTAL CA SCREEN DOC REV: CPT | Performed by: SURGERY

## 2023-09-19 NOTE — PROGRESS NOTES
[address]   [address] FAX: 089 [facility]  1965    Chief Complaint   Patient presents with    New Patient     New Patient  AMY Arterial 9/19/23; Right Wrist Pain           HPI   Ms. Librado Sosa is a 62y.o. year old female who follow-up status post cardiac cath several days ago. Patient currently on Eliquis. She has little pain over her radial artery site. Current Outpatient Medications   Medication Sig Dispense Refill    ELIQUIS 5 MG TABS tablet TAKE 1 TABLET BY MOUTH TWICE DAILY 180 tablet 3    ciprofloxacin (CIPRO) 500 MG tablet       ondansetron (ZOFRAN) 4 MG tablet Take 1 tablet by mouth every 8 hours as needed for Nausea or Vomiting      dilTIAZem (CARDIZEM CD) 360 MG extended release capsule Take 1 capsule by mouth daily 30 capsule 3    Buprenorphine HCl (BELBUCA) 150 MCG FILM Place inside cheek in the morning and at bedtime. busPIRone (BUSPAR) 10 MG tablet Taking 1/ 2 a day 5 mg, sometimes 10 mg      gabapentin (NEURONTIN) 300 MG capsule 1 capsule 3 times daily. One in AM, 2 at PM      tiZANidine (ZANAFLEX) 4 MG tablet Take 0.5-1 tablets by mouth every 8 hours as needed      DULoxetine (CYMBALTA) 60 MG extended release capsule Take 1 capsule by mouth 2 times daily Take bid      Calcium Carbonate (CALCIUM 500 PO) Take 1 tablet by mouth daily      Cyanocobalamin (VITAMIN B-12 PO) Take 1,000 mg by mouth daily      ferrous sulfate (IRON 325) 325 (65 Fe) MG tablet Take 65 mg by mouth every morning (before breakfast)      hydroCHLOROthiazide (MICROZIDE) 12.5 MG capsule Take 1 capsule by mouth every morning      nitrofurantoin (MACRODANTIN) 100 MG capsule Take 1 capsule by mouth in the morning and at bedtime (Patient not taking: Reported on 8/25/2023)      traMADol (ULTRAM) 50 MG tablet Take 50 mg by mouth every 6 hours as needed.  (Patient not taking: Reported on 6/20/2023)       No current facility-administered

## 2023-09-25 ASSESSMENT — SLEEP AND FATIGUE QUESTIONNAIRES
HOW LIKELY ARE YOU TO NOD OFF OR FALL ASLEEP WHILE SITTING QUIETLY AFTER LUNCH WITHOUT ALCOHOL: 3
HOW LIKELY ARE YOU TO NOD OFF OR FALL ASLEEP IN A CAR, WHILE STOPPED FOR A FEW MINUTES IN TRAFFIC: 0
HOW LIKELY ARE YOU TO NOD OFF OR FALL ASLEEP WHILE SITTING AND TALKING TO SOMEONE: 0
HOW LIKELY ARE YOU TO NOD OFF OR FALL ASLEEP WHILE LYING DOWN TO REST IN THE AFTERNOON WHEN CIRCUMSTANCES PERMIT: 2
HOW LIKELY ARE YOU TO NOD OFF OR FALL ASLEEP WHILE SITTING INACTIVE IN A PUBLIC PLACE: 1
HOW LIKELY ARE YOU TO NOD OFF OR FALL ASLEEP WHILE WATCHING TV: 2
ESS TOTAL SCORE: 12
HOW LIKELY ARE YOU TO NOD OFF OR FALL ASLEEP WHEN YOU ARE A PASSENGER IN A CAR FOR AN HOUR WITHOUT A BREAK: 2
HOW LIKELY ARE YOU TO NOD OFF OR FALL ASLEEP WHILE SITTING AND READING: 2

## 2023-09-25 NOTE — PROGRESS NOTES
overdrive. We also discussed the ability of nasal CPAP to correct these abnormalities through maintenance of a patent airway. Therapeutic options including surgery, oral appliances, and weight loss were also reviewed. 2. Hypoxemia  R09.02 -See above      3. Morbid obesity with BMI of 60.0-69.9, adult (HCC)  E66.01 -Part of this is from lymphedema. But needs to work on weight loss by improvement in diet    Z68.44       4. Hypersomnia, unspecified  G47.10 -Elevated 12/24      5. Lymphedema  I89.0 -Follow-up with lymphedema doctor may need further compression stockings or other options. 6. Atrial Fib  --will monitor if improved with PAP therpay. On cardizem at this time per Dr. Deandra Webber:    Begin fullface auto CPAP at 7-17 cmH2O, C-Flex 3 with humidification and a auto minute ramp and starting pressure of 7 cmH20. Use a Jayson fullface mask    Sleep compliance discussed with her today    Weight loss was encouraged through the reduction of caloric intake as well as an increase in aerobic physical activity. Sleep hygiene was discussed with the patient today. Did review [place] score, PSG and Titration study today. All questions are answered to the patient's satisfaction. The patient will call for further questions and concerns. Follow up at the [address] [place] [address] will be in 3 to 4 months. Follow up will be with the patient's referring physician as had been previously scheduled. Diana Santos MD    Over 50% of today's office visit was spent in face to face time reviewing test results, prognosis, importance of compliance, education about disease process, benefits of medications, instructions for management of acute flare-ups, and follow up plans. .    Electronically signed and dictated. Please note if there are errors this is  likely a result of the dictation software. No orders of the defined types were placed in this encounter.      No orders of the

## 2023-09-26 ENCOUNTER — OFFICE VISIT (OUTPATIENT)
Dept: SLEEP MEDICINE | Age: 58
End: 2023-09-26
Payer: MEDICARE

## 2023-09-26 VITALS
SYSTOLIC BLOOD PRESSURE: 128 MMHG | BODY MASS INDEX: 48.82 KG/M2 | OXYGEN SATURATION: 98 % | WEIGHT: 293 LBS | HEIGHT: 65 IN | DIASTOLIC BLOOD PRESSURE: 80 MMHG | RESPIRATION RATE: 14 BRPM | HEART RATE: 84 BPM

## 2023-09-26 DIAGNOSIS — G47.10 HYPERSOMNIA, UNSPECIFIED: ICD-10-CM

## 2023-09-26 DIAGNOSIS — G47.33 OSA (OBSTRUCTIVE SLEEP APNEA): Primary | ICD-10-CM

## 2023-09-26 DIAGNOSIS — R09.02 HYPOXEMIA: ICD-10-CM

## 2023-09-26 DIAGNOSIS — E66.01 MORBID OBESITY WITH BMI OF 60.0-69.9, ADULT (HCC): ICD-10-CM

## 2023-09-26 DIAGNOSIS — I89.0 LYMPHEDEMA: ICD-10-CM

## 2023-09-26 PROCEDURE — 99204 OFFICE O/P NEW MOD 45 MIN: CPT | Performed by: INTERNAL MEDICINE

## 2023-09-26 PROCEDURE — G8417 CALC BMI ABV UP PARAM F/U: HCPCS | Performed by: INTERNAL MEDICINE

## 2023-09-26 PROCEDURE — G8427 DOCREV CUR MEDS BY ELIG CLIN: HCPCS | Performed by: INTERNAL MEDICINE

## 2023-09-26 PROCEDURE — 1036F TOBACCO NON-USER: CPT | Performed by: INTERNAL MEDICINE

## 2023-09-26 PROCEDURE — 3017F COLORECTAL CA SCREEN DOC REV: CPT | Performed by: INTERNAL MEDICINE

## 2023-09-26 ASSESSMENT — ENCOUNTER SYMPTOMS
EYES NEGATIVE: 1
GASTROINTESTINAL NEGATIVE: 1
BACK PAIN: 1
ALLERGIC/IMMUNOLOGIC NEGATIVE: 1
RESPIRATORY NEGATIVE: 1

## 2023-10-05 ENCOUNTER — TELEPHONE (OUTPATIENT)
Dept: SLEEP MEDICINE | Age: 58
End: 2023-10-05

## 2023-10-05 NOTE — TELEPHONE ENCOUNTER
Patient was setup on 10/4/23 for cpap therapy per Linh Dubon @ [address] [place] [address].

## 2023-10-26 ENCOUNTER — TELEPHONE (OUTPATIENT)
Age: 58
End: 2023-10-26

## 2023-10-26 NOTE — TELEPHONE ENCOUNTER
----- Message from Issac Nunes MD sent at 10/20/2023  7:00 AM EDT -----  Could you please call the patient and give her resources for EASTON Leal.

## 2023-12-13 RX ORDER — DILTIAZEM HYDROCHLORIDE 360 MG/1
360 CAPSULE, EXTENDED RELEASE ORAL DAILY
Qty: 30 CAPSULE | Refills: 11 | Status: SHIPPED | OUTPATIENT
Start: 2023-12-13 | End: 2024-01-19

## 2024-01-19 RX ORDER — DILTIAZEM HYDROCHLORIDE 360 MG/1
360 CAPSULE, EXTENDED RELEASE ORAL DAILY
Qty: 30 CAPSULE | Refills: 11 | Status: SHIPPED | OUTPATIENT
Start: 2024-01-19

## 2024-01-19 NOTE — TELEPHONE ENCOUNTER
Prescription sent to [facility] pharmacy//sharmaine  Requested Prescriptions     Signed Prescriptions Disp Refills    dilTIAZem (CARDIZEM CD) 360 MG extended release capsule 30 capsule 11     Sig: TAKE 1 CAPSULE BY MOUTH DAILY     Authorizing Provider: ANDRÉS SRINIVASAN     Ordering User: CARTER ALSTON

## 2024-02-21 ENCOUNTER — APPOINTMENT (RX ONLY)
Dept: URBAN - METROPOLITAN AREA CLINIC 24 | Facility: CLINIC | Age: 59
Setting detail: DERMATOLOGY
End: 2024-02-21

## 2024-02-21 DIAGNOSIS — Z71.89 OTHER SPECIFIED COUNSELING: ICD-10-CM

## 2024-02-21 DIAGNOSIS — L82.1 OTHER SEBORRHEIC KERATOSIS: ICD-10-CM

## 2024-02-21 DIAGNOSIS — Z87.2 PERSONAL HISTORY OF DISEASES OF THE SKIN AND SUBCUTANEOUS TISSUE: ICD-10-CM

## 2024-02-21 DIAGNOSIS — D22 MELANOCYTIC NEVI: ICD-10-CM

## 2024-02-21 DIAGNOSIS — L57.8 OTHER SKIN CHANGES DUE TO CHRONIC EXPOSURE TO NONIONIZING RADIATION: ICD-10-CM

## 2024-02-21 DIAGNOSIS — L55.9 SUNBURN, UNSPECIFIED: ICD-10-CM

## 2024-02-21 PROBLEM — D22.5 MELANOCYTIC NEVI OF TRUNK: Status: ACTIVE | Noted: 2024-02-21

## 2024-02-21 PROBLEM — D22.71 MELANOCYTIC NEVI OF RIGHT LOWER LIMB, INCLUDING HIP: Status: ACTIVE | Noted: 2024-02-21

## 2024-02-21 PROBLEM — D22.39 MELANOCYTIC NEVI OF OTHER PARTS OF FACE: Status: ACTIVE | Noted: 2024-02-21

## 2024-02-21 PROCEDURE — 99213 OFFICE O/P EST LOW 20 MIN: CPT | Mod: 25

## 2024-02-21 PROCEDURE — 11300 SHAVE SKIN LESION 0.5 CM/<: CPT

## 2024-02-21 PROCEDURE — ? SHAVE REMOVAL

## 2024-02-21 PROCEDURE — ? COUNSELING

## 2024-02-21 ASSESSMENT — LOCATION SIMPLE DESCRIPTION DERM
LOCATION SIMPLE: RIGHT FOREARM
LOCATION SIMPLE: RIGHT POSTERIOR THIGH
LOCATION SIMPLE: RIGHT THIGH
LOCATION SIMPLE: LEFT CHEEK
LOCATION SIMPLE: LEFT UPPER ARM
LOCATION SIMPLE: RIGHT UPPER BACK
LOCATION SIMPLE: CHEST
LOCATION SIMPLE: ABDOMEN
LOCATION SIMPLE: LEFT LOWER BACK
LOCATION SIMPLE: LEFT UPPER BACK
LOCATION SIMPLE: RIGHT LOWER BACK

## 2024-02-21 ASSESSMENT — LOCATION DETAILED DESCRIPTION DERM
LOCATION DETAILED: LEFT MEDIAL SUPERIOR CHEST
LOCATION DETAILED: LEFT SUPERIOR MEDIAL MALAR CHEEK
LOCATION DETAILED: LEFT ANTERIOR PROXIMAL UPPER ARM
LOCATION DETAILED: RIGHT ANTERIOR PROXIMAL THIGH
LOCATION DETAILED: LEFT INFERIOR UPPER BACK
LOCATION DETAILED: LEFT SUPERIOR LATERAL MALAR CHEEK
LOCATION DETAILED: LEFT INFERIOR LATERAL MIDBACK
LOCATION DETAILED: RIGHT MEDIAL UPPER BACK
LOCATION DETAILED: RIGHT PROXIMAL DORSAL FOREARM
LOCATION DETAILED: RIGHT INFERIOR UPPER BACK
LOCATION DETAILED: PERIUMBILICAL SKIN
LOCATION DETAILED: RIGHT SUPERIOR MEDIAL MIDBACK
LOCATION DETAILED: RIGHT SUPERIOR MEDIAL UPPER BACK
LOCATION DETAILED: RIGHT DISTAL POSTERIOR THIGH

## 2024-02-21 ASSESSMENT — LOCATION ZONE DERM
LOCATION ZONE: FACE
LOCATION ZONE: ARM
LOCATION ZONE: TRUNK
LOCATION ZONE: LEG

## 2024-03-05 NOTE — PROGRESS NOTES
[facility]  [address]  [place] [address]  [phone]    Patient Name:  Vanna Smith  YOB: 1965      Office Visit 3/6/2024    CHIEF COMPLAINT:    Chief Complaint   Patient presents with    Sleep Apnea    Follow-up         HISTORY OF PRESENT ILLNESS:  Patient is an 58 y.o. female seen today for follow up of MICHOACANO. Pt had a PSG/HST on 7/20/23 with an AHI of 16.4/hr with desaturations to 78%. Pt is on CPAP 7-17 cm H2O.   Pt admitted 11/28/23 with SBO to [facility] and required exploratory laparotomy 12/1/23. She was hospitalized for 8 days. After that, she admits that her sleep schedule was messed up. As a result, it has taken her a while to get back into a good sleep routine. Pt is still struggling as a result.   She uses a full face mask. Her mask has started having a lot of condensation and moisture in it. It is so much moisture, that it will bubble up and make noise around the seal of her mask. This is waking her and her  up. Pt gets frustrated and will take her CPAP off.   Pt does feel better than she did without CPAP but she knows she could feel even better if she could get more consistent use of her CPAP. Her tube temperature is set at 81 degrees and her humidity is set on auto. We discussed adjusting both to improve the condensation. Pt was sent the instructions via [employer] so she will have those for the future.   Pt has used APAP 67/90 days with 37/90 days more than 4 hours per day. Pt's average use is 4 hours per day. Pt has a mask leak of 0.4L/min with an AHI of 2.2/hr. Pt is benefiting and tolerating PAP therapy. Her pressure was adjusted to 7-12cm H2O in [facility] based on her download.           3/6/2024    11:25 AM 9/25/2023     3:48 PM   Sleep Medicine   Sitting and reading 2 2   Watching TV 3 2   Sitting, inactive in a public place (e.g. a theatre or a meeting) 0 1   As a passenger in a car for an hour without a break 0 2   Lying

## 2024-03-06 ENCOUNTER — OFFICE VISIT (OUTPATIENT)
Dept: SLEEP MEDICINE | Age: 59
End: 2024-03-06
Payer: MEDICARE

## 2024-03-06 VITALS
RESPIRATION RATE: 16 BRPM | HEART RATE: 76 BPM | WEIGHT: 293 LBS | DIASTOLIC BLOOD PRESSURE: 72 MMHG | OXYGEN SATURATION: 98 % | SYSTOLIC BLOOD PRESSURE: 126 MMHG | HEIGHT: 65 IN | BODY MASS INDEX: 48.82 KG/M2

## 2024-03-06 DIAGNOSIS — G47.10 HYPERSOMNIA, UNSPECIFIED: ICD-10-CM

## 2024-03-06 DIAGNOSIS — E66.01 MORBID OBESITY WITH BMI OF 60.0-69.9, ADULT (HCC): ICD-10-CM

## 2024-03-06 DIAGNOSIS — R09.02 HYPOXEMIA: ICD-10-CM

## 2024-03-06 DIAGNOSIS — G47.33 OSA (OBSTRUCTIVE SLEEP APNEA): Primary | ICD-10-CM

## 2024-03-06 PROCEDURE — 99214 OFFICE O/P EST MOD 30 MIN: CPT | Performed by: PHYSICIAN ASSISTANT

## 2024-03-06 PROCEDURE — G8417 CALC BMI ABV UP PARAM F/U: HCPCS | Performed by: PHYSICIAN ASSISTANT

## 2024-03-06 PROCEDURE — 3017F COLORECTAL CA SCREEN DOC REV: CPT | Performed by: PHYSICIAN ASSISTANT

## 2024-03-06 PROCEDURE — G8427 DOCREV CUR MEDS BY ELIG CLIN: HCPCS | Performed by: PHYSICIAN ASSISTANT

## 2024-03-06 PROCEDURE — G8484 FLU IMMUNIZE NO ADMIN: HCPCS | Performed by: PHYSICIAN ASSISTANT

## 2024-03-06 PROCEDURE — 1036F TOBACCO NON-USER: CPT | Performed by: PHYSICIAN ASSISTANT

## 2024-03-06 RX ORDER — HYDROCODONE BITARTRATE AND ACETAMINOPHEN 7.5; 325 MG/1; MG/1
1 TABLET ORAL EVERY 8 HOURS PRN
COMMUNITY
End: 2024-03-06

## 2024-03-06 RX ORDER — OXYCODONE HYDROCHLORIDE AND ACETAMINOPHEN 5; 325 MG/1; MG/1
1 TABLET ORAL EVERY 6 HOURS PRN
COMMUNITY

## 2024-03-06 RX ORDER — ESTRADIOL 1 MG/1
1 TABLET ORAL DAILY
COMMUNITY
Start: 2023-09-14

## 2024-03-06 ASSESSMENT — SLEEP AND FATIGUE QUESTIONNAIRES
HOW LIKELY ARE YOU TO NOD OFF OR FALL ASLEEP WHILE SITTING QUIETLY AFTER LUNCH WITHOUT ALCOHOL: 2
HOW LIKELY ARE YOU TO NOD OFF OR FALL ASLEEP WHILE SITTING AND TALKING TO SOMEONE: 0
HOW LIKELY ARE YOU TO NOD OFF OR FALL ASLEEP WHILE WATCHING TV: 3
ESS TOTAL SCORE: 7
HOW LIKELY ARE YOU TO NOD OFF OR FALL ASLEEP WHEN YOU ARE A PASSENGER IN A CAR FOR AN HOUR WITHOUT A BREAK: 0
HOW LIKELY ARE YOU TO NOD OFF OR FALL ASLEEP WHILE LYING DOWN TO REST IN THE AFTERNOON WHEN CIRCUMSTANCES PERMIT: 0
HOW LIKELY ARE YOU TO NOD OFF OR FALL ASLEEP IN A CAR, WHILE STOPPED FOR A FEW MINUTES IN TRAFFIC: 0
HOW LIKELY ARE YOU TO NOD OFF OR FALL ASLEEP WHILE SITTING INACTIVE IN A PUBLIC PLACE: 0
HOW LIKELY ARE YOU TO NOD OFF OR FALL ASLEEP WHILE SITTING AND READING: 2

## 2024-03-29 ENCOUNTER — OFFICE VISIT (OUTPATIENT)
Age: 59
End: 2024-03-29
Payer: MEDICARE

## 2024-03-29 VITALS
HEIGHT: 65 IN | WEIGHT: 293 LBS | BODY MASS INDEX: 48.82 KG/M2 | HEART RATE: 64 BPM | SYSTOLIC BLOOD PRESSURE: 128 MMHG | DIASTOLIC BLOOD PRESSURE: 76 MMHG

## 2024-03-29 DIAGNOSIS — I48.91 ATRIAL FIBRILLATION, UNSPECIFIED TYPE (HCC): Primary | ICD-10-CM

## 2024-03-29 DIAGNOSIS — G47.33 OSA (OBSTRUCTIVE SLEEP APNEA): ICD-10-CM

## 2024-03-29 DIAGNOSIS — E66.9 OBESITY, UNSPECIFIED OBESITY SEVERITY, UNSPECIFIED OBESITY TYPE: ICD-10-CM

## 2024-03-29 PROCEDURE — G8417 CALC BMI ABV UP PARAM F/U: HCPCS | Performed by: INTERNAL MEDICINE

## 2024-03-29 PROCEDURE — 1036F TOBACCO NON-USER: CPT | Performed by: INTERNAL MEDICINE

## 2024-03-29 PROCEDURE — 3017F COLORECTAL CA SCREEN DOC REV: CPT | Performed by: INTERNAL MEDICINE

## 2024-03-29 PROCEDURE — G8484 FLU IMMUNIZE NO ADMIN: HCPCS | Performed by: INTERNAL MEDICINE

## 2024-03-29 PROCEDURE — 99214 OFFICE O/P EST MOD 30 MIN: CPT | Performed by: INTERNAL MEDICINE

## 2024-03-29 PROCEDURE — G8428 CUR MEDS NOT DOCUMENT: HCPCS | Performed by: INTERNAL MEDICINE

## 2024-03-29 RX ORDER — VALACYCLOVIR HYDROCHLORIDE 1 G/1
1000 TABLET, FILM COATED ORAL AS NEEDED
COMMUNITY

## 2024-03-29 RX ORDER — SEMAGLUTIDE 0.25 MG/.5ML
INJECTION, SOLUTION SUBCUTANEOUS
Qty: 4 ML | Refills: 4 | Status: SHIPPED | OUTPATIENT
Start: 2024-03-29 | End: 2024-07-26

## 2024-03-29 ASSESSMENT — ENCOUNTER SYMPTOMS
APHONIA: 0
COUGH: 0
NAIL CHANGES: 0
STRIDOR: 0
EYE PAIN: 0
ABDOMINAL PAIN: 0

## 2024-03-29 NOTE — PROGRESS NOTES
Father     Sleep Apnea Father     Sleep Apnea Sister     Sleep Apnea Brother     Breast Cancer Paternal Grandmother     Other Neg Hx     Post-op Cognitive Dysfunction Neg Hx     Emergence Delirium Neg Hx     Post-op Nausea/Vomiting Neg Hx     Delayed Awakening Neg Hx     Pseudochol. Deficiency Neg Hx     Malig Hypertherm Neg Hx      Social History     Tobacco Use    Smoking status: Never    Smokeless tobacco: Never   Substance Use Topics    Alcohol use: No       ROS:    Review of Systems   Constitutional: Negative for fever.   HENT:  Negative for stridor.    Eyes:  Negative for pain.   Cardiovascular:  Negative for chest pain.   Respiratory:  Negative for cough.    Endocrine: Negative for cold intolerance.   Skin:  Negative for nail changes.   Musculoskeletal:  Negative for arthritis.   Gastrointestinal:  Negative for abdominal pain.   Genitourinary:  Negative for dysuria.   Neurological:  Negative for aphonia.   Psychiatric/Behavioral:  Negative for altered mental status.    Allergic/Immunologic: Negative for hives.          PHYSICAL EXAM:   /76   Pulse 64   Ht 1.651 m (5' 5\")   Wt (!) 167.4 kg (369 lb)   BMI 61.40 kg/m²      Physical Exam  Vitals reviewed.   HENT:      Head: Normocephalic.      Right Ear: External ear normal.      Left Ear: External ear normal.      Nose: Nose normal.   Eyes:      General: No scleral icterus.  Pulmonary:      Effort: Pulmonary effort is normal.   Abdominal:      General: There is no distension.   Musculoskeletal:      Cervical back: Neck supple.      Comments: Tenderness in right radial artery induration    Skin:     General: Skin is warm.   Neurological:      Mental Status: She is alert. Mental status is at baseline.         Medical problems and test results were reviewed with the patient today.     No results found for any visits on 03/29/24.      No results found for this or any previous visit (from the past 672 hour(s)).    No results found for: \"CHOL\", \"CHOLPOCT\",

## 2024-04-08 ENCOUNTER — CLINICAL DOCUMENTATION (OUTPATIENT)
Age: 59
End: 2024-04-08

## 2024-04-11 NOTE — PROGRESS NOTES
Your prior authorization request has been denied.    The Medicare rule in the Prescription Drug Manual (Chapter 6, Section 20.1) says drugs used to help you lose weight are excluded from Medicare Part D coverage. [employer] follows Medicare rules. The information we have about your case says your drug is being used for weight loss and per Medicare rules isnt covered.

## 2024-04-25 ENCOUNTER — PATIENT MESSAGE (OUTPATIENT)
Age: 59
End: 2024-04-25

## 2024-04-26 RX ORDER — FLECAINIDE ACETATE 100 MG/1
100 TABLET ORAL PRN
Qty: 30 TABLET | Refills: 1 | Status: SHIPPED | OUTPATIENT
Start: 2024-04-26

## 2024-04-26 NOTE — TELEPHONE ENCOUNTER
----- Message from Elliott Nichols MD sent at 4/26/2024  1:23 PM EDT -----  Regarding: FW: Reyna   Contact: [phone]  The patient has no history of coronary disease we can start flecainide 100 mg every 24 as needed A-fib attacks.  Please make sure the patient is taking her diltiazem if taking flecainide  ----- Message -----  From: Marlene Arambula MA  Sent: 4/25/2024   5:18 PM EDT  To: Elliott Nichols MD  Subject: FW: Avelinamarta                                           ----- Message -----  From: Jun Auguste MA  Sent: 4/25/2024   3:35 PM EDT  To: Marlene Arambula MA  Subject: FW: Avelinamarta                                           ----- Message -----  From: Vanna Smith  Sent: 4/25/2024   3:23 PM EDT  To: \[facility]\"" Cardiology Clinical Staff  Subject: Reyna                                             Had a few episodes yesterday for about 2 hours, I’ve been sick for four days with a sinus infection not sure if I have anything to do with it or not but my heart rate was running really high. Just wanted to let you know and see if I needed to do anything. I leave Saturday for a cruise and won’t be back until the following Saturday.  Thanks Vanna Smith

## 2024-04-26 NOTE — TELEPHONE ENCOUNTER
The patient may rhythm tracing showing atrial fibrillation with rapid ventricular response.  Please call the patient to see if this has resolved.  She is currently on anticoagulation.  Recommendations will be made based on patient's response.

## 2024-04-26 NOTE — TELEPHONE ENCOUNTER
Pt has not able to use CPAP due to sinus infection.   CPAP resolved A Fib in the past.  Not taking decongestants.  Started Z-pack yesterday, in addition to Cefuroxime.   Denies A Fib today.  HR 85 bpm.   Starting to feel better today.   Leaves for [place] tomorrow.  Cruising to [facility] [place] [facility] Mon.   Taking CPAP on cruise.  cgh  .

## 2024-04-26 NOTE — TELEPHONE ENCOUNTER
Andrés Srinivasan MD  You22 minutes ago (1:23 PM)       The patient has no history of coronary disease we can start flecainide 100 mg every 24 as needed A-fib attacks.  Please make sure the patient is taking her diltiazem if taking flecainide   Informed pt of Dr. Srinivasan's response. Explained how to use Flecainide x1 daily, prn for A fib. Encourage pt to return call, prn. Pt voiced understanding. Confirms she is taking Diltiazem. Cgh  Requested Prescriptions     Signed Prescriptions Disp Refills    flecainide (TAMBOCOR) 100 MG tablet 30 tablet 1     Sig: Take 1 tablet by mouth as needed (A fib) For A fib     Authorizing Provider: ANDRÉS SRINIVASAN     Ordering User: KATRIN CORNEJO

## 2024-05-13 ENCOUNTER — TELEPHONE (OUTPATIENT)
Age: 59
End: 2024-05-13

## 2024-05-13 ENCOUNTER — OFFICE VISIT (OUTPATIENT)
Age: 59
End: 2024-05-13
Payer: MEDICARE

## 2024-05-13 ENCOUNTER — PATIENT MESSAGE (OUTPATIENT)
Age: 59
End: 2024-05-13

## 2024-05-13 VITALS
HEIGHT: 65 IN | WEIGHT: 293 LBS | SYSTOLIC BLOOD PRESSURE: 130 MMHG | BODY MASS INDEX: 48.82 KG/M2 | DIASTOLIC BLOOD PRESSURE: 80 MMHG | HEART RATE: 84 BPM

## 2024-05-13 DIAGNOSIS — G47.33 OSA (OBSTRUCTIVE SLEEP APNEA): ICD-10-CM

## 2024-05-13 DIAGNOSIS — I48.91 ATRIAL FIBRILLATION, UNSPECIFIED TYPE (HCC): Primary | ICD-10-CM

## 2024-05-13 DIAGNOSIS — I48.91 ATRIAL FIBRILLATION, UNSPECIFIED TYPE (HCC): ICD-10-CM

## 2024-05-13 LAB
ANION GAP SERPL CALC-SCNC: 10 MMOL/L (ref 9–18)
BASOPHILS # BLD: 0.1 K/UL (ref 0–0.2)
BASOPHILS NFR BLD: 1 % (ref 0–2)
BUN SERPL-MCNC: 14 MG/DL (ref 6–23)
CALCIUM SERPL-MCNC: 9.8 MG/DL (ref 8.8–10.2)
CHLORIDE SERPL-SCNC: 98 MMOL/L (ref 98–107)
CO2 SERPL-SCNC: 30 MMOL/L (ref 20–28)
CREAT SERPL-MCNC: 0.65 MG/DL (ref 0.6–1.1)
DIFFERENTIAL METHOD BLD: ABNORMAL
EOSINOPHIL # BLD: 0.1 K/UL (ref 0–0.8)
EOSINOPHIL NFR BLD: 1 % (ref 0.5–7.8)
ERYTHROCYTE [DISTWIDTH] IN BLOOD BY AUTOMATED COUNT: 13.8 % (ref 11.9–14.6)
GLUCOSE SERPL-MCNC: 108 MG/DL (ref 70–99)
HCT VFR BLD AUTO: 48.5 % (ref 35.8–46.3)
HGB BLD-MCNC: 15.1 G/DL (ref 11.7–15.4)
IMM GRANULOCYTES # BLD AUTO: 0.1 K/UL (ref 0–0.5)
IMM GRANULOCYTES NFR BLD AUTO: 1 % (ref 0–5)
LYMPHOCYTES # BLD: 2 K/UL (ref 0.5–4.6)
LYMPHOCYTES NFR BLD: 27 % (ref 13–44)
MCH RBC QN AUTO: 30.2 PG (ref 26.1–32.9)
MCHC RBC AUTO-ENTMCNC: 31.1 G/DL (ref 31.4–35)
MCV RBC AUTO: 97 FL (ref 82–102)
MONOCYTES # BLD: 0.5 K/UL (ref 0.1–1.3)
MONOCYTES NFR BLD: 7 % (ref 4–12)
NEUTS SEG # BLD: 4.8 K/UL (ref 1.7–8.2)
NEUTS SEG NFR BLD: 64 % (ref 43–78)
NRBC # BLD: 0 K/UL (ref 0–0.2)
PLATELET # BLD AUTO: 288 K/UL (ref 150–450)
PMV BLD AUTO: 10.8 FL (ref 9.4–12.3)
POTASSIUM SERPL-SCNC: 4.5 MMOL/L (ref 3.5–5.1)
RBC # BLD AUTO: 5 M/UL (ref 4.05–5.2)
SODIUM SERPL-SCNC: 138 MMOL/L (ref 136–145)
T4 FREE SERPL-MCNC: 1.1 NG/DL (ref 0.9–1.7)
TSH W FREE THYROID IF ABNORMAL: 4.39 UIU/ML (ref 0.27–4.2)
WBC # BLD AUTO: 7.5 K/UL (ref 4.3–11.1)

## 2024-05-13 PROCEDURE — 99214 OFFICE O/P EST MOD 30 MIN: CPT | Performed by: INTERNAL MEDICINE

## 2024-05-13 PROCEDURE — 3017F COLORECTAL CA SCREEN DOC REV: CPT | Performed by: INTERNAL MEDICINE

## 2024-05-13 PROCEDURE — G8417 CALC BMI ABV UP PARAM F/U: HCPCS | Performed by: INTERNAL MEDICINE

## 2024-05-13 PROCEDURE — 1036F TOBACCO NON-USER: CPT | Performed by: INTERNAL MEDICINE

## 2024-05-13 PROCEDURE — 93000 ELECTROCARDIOGRAM COMPLETE: CPT | Performed by: INTERNAL MEDICINE

## 2024-05-13 PROCEDURE — G8427 DOCREV CUR MEDS BY ELIG CLIN: HCPCS | Performed by: INTERNAL MEDICINE

## 2024-05-13 RX ORDER — FLECAINIDE ACETATE 50 MG/1
50 TABLET ORAL 2 TIMES DAILY
Qty: 180 TABLET | Refills: 3 | Status: SHIPPED | OUTPATIENT
Start: 2024-05-13

## 2024-05-13 ASSESSMENT — ENCOUNTER SYMPTOMS
APHONIA: 0
NAIL CHANGES: 0
STRIDOR: 0
EYE PAIN: 0
ABDOMINAL PAIN: 0

## 2024-05-13 NOTE — TELEPHONE ENCOUNTER
----- Message from Marlene Arambula MA sent at 5/13/2024  9:13 AM EDT -----  Regarding: FW: Afib   Contact: [phone]    ----- Message -----  From: Jun Auguste MA  Sent: 5/13/2024   8:45 AM EDT  To: Marlene Arambula MA  Subject: FW: Afib                                           ----- Message -----  From: Vanna Smith  Sent: 5/13/2024   7:33 AM EDT  To: [facility] Cardiology Clinical Staff  Subject: Afib                                             My heart has been in Afib most of the night and off and on for about 3 days, Ranging from 140-170. At 2am I took one of those pills you prescribed and it still continuted. Not sure if it related or not but I’ve had leg cramps, hot flashes and the jitters off and on also for the last 3 days. Should I make an appointment to see you?   Thanks,  Vanna Smith      10:01am Pt c/o afib last night with rapid rate.  Took flecainide 100mg that did not seem to help.  Takes diltiazem 360mg q hs  However, HR now is 111. C/o TELLO. Denies other symptoms  Does not have a way to check BP.    Asking if Dr. Nichols has further recommendations, change in medications, etc.  Reviewed ER precautions with pt.  Will inform Dr. Nichols.

## 2024-05-13 NOTE — PROGRESS NOTES
[address] [employer] [employer] [facility], [address] [place] [address]  PHONE: [phone]    SUBJECTIVE:   Vanna Smith is a 59 y.o. female 1965   seen for a consultation visit regarding the following:     Chief Complaint   Patient presents with    Atrial Fibrillation    Follow-up        History of present illness: 59 y.o. female recent A-fib patient has a wearable device she has definite A-fib on her Apple Watch rates in the 170 bpm range.  Today she is in sinus rhythm she is extremely compliant with her CPAP.    Interval history:   presented for consultation 6/20/2023.  Patient with previous history of cardiac catheterization in 2012 at [facility].  She presented for evaluation of worsening fatigue palpitations and concerns for atrial fibrillation.  Patient has an Apple watch in place and is recorded several events of tachycardia occurring intermittently.  Her device is registered with these as atrial fibrillation.  Patient is a previous history of obstructive sleep apnea but discontinued after previous weight loss    Cardiac History:  2012 cardiac catheterization [facility] system no obstructive coronary disease identified  6/20/2023 EKG sinus tachycardia nonspecific ST abnormalities  7/19/2023 echocardiogram ejection fraction 55% no major valvular abnormalities normal diastolic function  8/22/2023 nuclear stress perfusion study moderate risk apical ischemia  8/30/2023 cardiac catheterization with normal epicardial coronary arteries normal cardiac output and pulmonary capillary wedge pressure mildly elevated PA pressure  Access site for the RHC was obtained via the right brachial vein. Capac-Shellie catheter was used.   PA pressure = 46/23 mmHg. PA mean = 24 mmHg. PA Sat = 78 %.   Mid RA pressure = 13/9 mmHg. Mid RA mean = 8 mmHg. RV pressure = 49/5 mmHg. RV mean = 20 mmHg. Wedge pressure = 14 mmHg. TDCO = 8.67 L/min.   Normal cardiac output, normal PCWP  Mildly elevated PA pressures  R brachial vein,

## 2024-05-13 NOTE — TELEPHONE ENCOUNTER
Triage informed pt of Dr. Nichols's response and scheduled appt for today at 11:15. Pt verbalizes understanding and confirms appt date, time, and location.

## 2024-05-14 NOTE — RESULT ENCOUNTER NOTE
Your most recent labs show no major abnormalities.  Your thyroid function studies are consistent with subacute hypothyroidism.  No additional treatment is needed at this time.    Please let me know if you have additional questions or concerns.    Elliott Nichols MD

## 2024-07-06 ENCOUNTER — HOSPITAL ENCOUNTER (OUTPATIENT)
Dept: MAMMOGRAPHY | Age: 59
End: 2024-07-06
Payer: MEDICARE

## 2024-07-06 VITALS — BODY MASS INDEX: 61.57 KG/M2 | WEIGHT: 293 LBS

## 2024-07-06 DIAGNOSIS — Z12.31 VISIT FOR SCREENING MAMMOGRAM: ICD-10-CM

## 2024-07-06 PROCEDURE — 77067 SCR MAMMO BI INCL CAD: CPT

## 2024-08-07 ENCOUNTER — INITIAL CONSULT (OUTPATIENT)
Age: 59
End: 2024-08-07
Payer: MEDICARE

## 2024-08-07 VITALS
DIASTOLIC BLOOD PRESSURE: 62 MMHG | WEIGHT: 293 LBS | HEART RATE: 88 BPM | HEIGHT: 65 IN | SYSTOLIC BLOOD PRESSURE: 134 MMHG | BODY MASS INDEX: 48.82 KG/M2

## 2024-08-07 DIAGNOSIS — I48.91 ATRIAL FIBRILLATION, UNSPECIFIED TYPE (HCC): Primary | ICD-10-CM

## 2024-08-07 DIAGNOSIS — I48.0 PAROXYSMAL ATRIAL FIBRILLATION (HCC): ICD-10-CM

## 2024-08-07 PROCEDURE — G8417 CALC BMI ABV UP PARAM F/U: HCPCS | Performed by: INTERNAL MEDICINE

## 2024-08-07 PROCEDURE — 1036F TOBACCO NON-USER: CPT | Performed by: INTERNAL MEDICINE

## 2024-08-07 PROCEDURE — G8428 CUR MEDS NOT DOCUMENT: HCPCS | Performed by: INTERNAL MEDICINE

## 2024-08-07 PROCEDURE — 99204 OFFICE O/P NEW MOD 45 MIN: CPT | Performed by: INTERNAL MEDICINE

## 2024-08-07 PROCEDURE — 3017F COLORECTAL CA SCREEN DOC REV: CPT | Performed by: INTERNAL MEDICINE

## 2024-08-07 PROCEDURE — 93000 ELECTROCARDIOGRAM COMPLETE: CPT | Performed by: INTERNAL MEDICINE

## 2024-08-07 NOTE — PROGRESS NOTES
[facility] CARDIOLOGY, [address], [place] [address]  PHONE: [phone]  Vanna Smith  1965    Chief Complant:    Chief Complaint   Patient presents with    Consultation    Atrial Fibrillation      Consultation is requested by [unfilled] for evaluation of Consultation and Atrial Fibrillation    Reason for Consultation: afib    History:  Vanna Smith is a very pleasant 59 y.o. female with a past medical and cardiac history significant for pAF, MICHOACANO, HTN, obesity and presents for EP consultation for atrial fibrillation. She has had a history of pAF, episodes of rapid irregular HR, palpitations, some SOB, TELLO, fatigue. Episodes typically lasting minutes to an hour. She has been on flecainide for the past few months.    Cardiac PMH: (Old records have been reviewed and summarized below)  8/30/2023 cardiac catheterization with normal epicardial coronary arteries normal cardiac output and pulmonary capillary wedge pressure mildly elevated PA pressure     Reviewed office note Dr. Baez 7/3/24    Past Medical History, Past Surgical History, Family history, Social History, and Medications were all reviewed with the patient today and updated as necessary.     Current Outpatient Medications   Medication Sig Dispense Refill    flecainide (TAMBOCOR) 50 MG tablet Take 1 tablet by mouth 2 times daily 180 tablet 3    Multiple Vitamin (MULTIVITAMIN ADULT PO) Take by mouth      valACYclovir (VALTREX) 1 g tablet Take 1 tablet by mouth as needed      estradiol (ESTRACE) 1 MG tablet Take 1 tablet by mouth daily      oxyCODONE-acetaminophen (PERCOCET) 5-325 MG per tablet Take 1 tablet by mouth every 6 hours as needed for Pain.      dilTIAZem (CARDIZEM CD) 360 MG extended release capsule TAKE 1 CAPSULE BY MOUTH DAILY 30 capsule 11    ELIQUIS 5 MG TABS tablet TAKE 1 TABLET BY MOUTH TWICE DAILY 180 tablet 3    ondansetron (ZOFRAN) 4 MG tablet Take 1 tablet by mouth every 8 hours as needed

## 2024-08-23 ENCOUNTER — OFFICE VISIT (OUTPATIENT)
Age: 59
End: 2024-08-23
Payer: MEDICARE

## 2024-08-23 VITALS
BODY MASS INDEX: 48.82 KG/M2 | WEIGHT: 293 LBS | HEART RATE: 68 BPM | SYSTOLIC BLOOD PRESSURE: 112 MMHG | DIASTOLIC BLOOD PRESSURE: 74 MMHG | HEIGHT: 65 IN

## 2024-08-23 DIAGNOSIS — I48.0 PAROXYSMAL ATRIAL FIBRILLATION (HCC): Primary | ICD-10-CM

## 2024-08-23 DIAGNOSIS — R73.02 GLUCOSE INTOLERANCE (IMPAIRED GLUCOSE TOLERANCE): ICD-10-CM

## 2024-08-23 DIAGNOSIS — E74.39 GLUCOSE INTOLERANCE: ICD-10-CM

## 2024-08-23 DIAGNOSIS — G47.33 OSA (OBSTRUCTIVE SLEEP APNEA): ICD-10-CM

## 2024-08-23 LAB
EST. AVERAGE GLUCOSE BLD GHB EST-MCNC: 119 MG/DL
HBA1C MFR BLD: 5.8 % (ref 0–5.6)

## 2024-08-23 PROCEDURE — G8417 CALC BMI ABV UP PARAM F/U: HCPCS | Performed by: INTERNAL MEDICINE

## 2024-08-23 PROCEDURE — 3017F COLORECTAL CA SCREEN DOC REV: CPT | Performed by: INTERNAL MEDICINE

## 2024-08-23 PROCEDURE — 1036F TOBACCO NON-USER: CPT | Performed by: INTERNAL MEDICINE

## 2024-08-23 PROCEDURE — G8428 CUR MEDS NOT DOCUMENT: HCPCS | Performed by: INTERNAL MEDICINE

## 2024-08-23 PROCEDURE — 99214 OFFICE O/P EST MOD 30 MIN: CPT | Performed by: INTERNAL MEDICINE

## 2024-08-23 RX ORDER — DILTIAZEM HYDROCHLORIDE 180 MG/1
180 CAPSULE, COATED, EXTENDED RELEASE ORAL 2 TIMES DAILY
Qty: 180 CAPSULE | Refills: 3 | Status: SHIPPED | OUTPATIENT
Start: 2024-08-23

## 2024-08-23 ASSESSMENT — ENCOUNTER SYMPTOMS
STRIDOR: 0
NAIL CHANGES: 0
COUGH: 0
ABDOMINAL PAIN: 0
EYE PAIN: 0
APHONIA: 0

## 2024-08-23 NOTE — PROGRESS NOTES
2 [facility], [address] [place] [address]  PHONE: [phone]    SUBJECTIVE:   Vanna Smith is a 59 y.o. female 1965      Chief Complaint   Patient presents with    Atrial Fibrillation        History of present illness: 59 y.o. female follow-up 8/23/2024 currently using CPAP therapy.  She has had improvement of her palpitations after changes been made to her antiarrhythmic therapies.  She is now taking flecainide on a regular basis.  She has a follow-up with Dr. Wei for consideration for catheter ablation.    Interval history:   presented for consultation 6/20/2023.  Patient with previous history of cardiac catheterization in 2012 at [facility].  She presented for evaluation of worsening fatigue palpitations and concerns for atrial fibrillation.  Patient has an Apple watch in place and is recorded several events of tachycardia occurring intermittently.  Her device is registered with these as atrial fibrillation.  Patient is a previous history of obstructive sleep apnea but discontinued after previous weight loss    Cardiac History:  2012 cardiac catheterization [facility] system no obstructive coronary disease identified  6/20/2023 EKG sinus tachycardia nonspecific ST abnormalities  7/19/2023 echocardiogram ejection fraction 55% no major valvular abnormalities normal diastolic function  8/22/2023 nuclear stress perfusion study moderate risk apical ischemia  8/30/2023 cardiac catheterization with normal epicardial coronary arteries normal cardiac output and pulmonary capillary wedge pressure mildly elevated PA pressure  Access site for the RHC was obtained via the right brachial vein. [place] catheter was used.   PA pressure = 46/23 mmHg. PA mean = 24 mmHg. PA Sat = 78 %.   Mid RA pressure = 13/9 mmHg. Mid RA mean = 8 mmHg. RV pressure = 49/5 mmHg. RV mean = 20 mmHg. Wedge pressure = 14 mmHg. TDCO = 8.67 L/min.   Normal cardiac output, normal PCWP  Mildly elevated PA

## 2024-08-26 NOTE — RESULT ENCOUNTER NOTE
Your most recent hemoglobin A1c was consistent with prediabetes.    Please let me know if you have additional questions or concerns.    Elliott Nichols MD

## 2024-09-03 ENCOUNTER — TELEPHONE (OUTPATIENT)
Age: 59
End: 2024-09-03

## 2024-09-03 DIAGNOSIS — R07.9 CHEST PAIN, UNSPECIFIED TYPE: Primary | ICD-10-CM

## 2024-09-03 NOTE — TELEPHONE ENCOUNTER
----- Message from Dr. Marquez Wei MD sent at 9/3/2024  7:10 AM EDT -----  No atrial fibrillation, frequent PVCs ~19%, recommend checking limited echo, follow up with NP after echo results available

## 2024-11-01 ENCOUNTER — TELEPHONE (OUTPATIENT)
Age: 59
End: 2024-11-01

## 2024-11-01 NOTE — TELEPHONE ENCOUNTER
MEDICATION REFILL REQUEST      Name of Medication:  apixaban   Dose:  5 mg   Frequency:  twice a day   Quantity:    Days' supply:  90 day       Pharmacy Name/Location:  [facility]/ Fx: [phone]

## 2024-11-06 ENCOUNTER — OFFICE VISIT (OUTPATIENT)
Dept: ORTHOPEDIC SURGERY | Age: 59
End: 2024-11-06

## 2024-11-06 VITALS — HEIGHT: 65 IN | BODY MASS INDEX: 48.82 KG/M2 | WEIGHT: 293 LBS

## 2024-11-06 DIAGNOSIS — M17.12 PRIMARY OSTEOARTHRITIS OF LEFT KNEE: Primary | ICD-10-CM

## 2024-11-06 DIAGNOSIS — M25.562 LEFT KNEE PAIN, UNSPECIFIED CHRONICITY: ICD-10-CM

## 2024-11-06 PROCEDURE — 3017F COLORECTAL CA SCREEN DOC REV: CPT | Performed by: ORTHOPAEDIC SURGERY

## 2024-11-06 PROCEDURE — 1036F TOBACCO NON-USER: CPT | Performed by: ORTHOPAEDIC SURGERY

## 2024-11-06 RX ORDER — METHYLPREDNISOLONE ACETATE 40 MG/ML
40 INJECTION, SUSPENSION INTRA-ARTICULAR; INTRALESIONAL; INTRAMUSCULAR; SOFT TISSUE ONCE
Status: COMPLETED | OUTPATIENT
Start: 2024-11-06 | End: 2024-11-06

## 2024-11-06 RX ADMIN — METHYLPREDNISOLONE ACETATE 40 MG: 40 INJECTION, SUSPENSION INTRA-ARTICULAR; INTRALESIONAL; INTRAMUSCULAR; SOFT TISSUE at 10:23

## 2024-11-06 NOTE — PROGRESS NOTES
Name: Vanna Smith  YOB: 1965  Gender: female  MRN: [id number]      Chief complaint:  LEFT knee pain    History of Present Illness:     Vanna Smith is a 59 y.o. female  The pain has been present for several weeks following injury when she stepped off of her tractor, jarring the left knee.  They state the pain is located anterior, medial, and lateral.  The patient describes the quality of the pain as a deep ache.    The symptoms are exacerbated by weight bearing, walking and standing for long periods of time.    The pain is also exacerbated by ascending and descending stairs, getting up and down from a chair.    They deny any previous surgery on the knee.  Previous treatment includes anti-inflammatories, use of cane/walker and activity modification.  These treatment options have not helped.  She does have a history of a right TKA performed Dr. Quintana in 2012.  Voices no issues with the right knee today.     Past Medical History:    Allergies:  Allergies   Allergen Reactions    Sulfamethoxazole-Trimethoprim Swelling       Medications:  Current Outpatient Medications   Medication Sig    diclofenac sodium (VOLTAREN) 1 % GEL Apply 4g to the affected area 4 times daily.    dilTIAZem (CARDIZEM CD) 180 MG extended release capsule Take 1 capsule by mouth in the morning and at bedtime    flecainide (TAMBOCOR) 50 MG tablet Take 1 tablet by mouth 2 times daily    Multiple Vitamin (MULTIVITAMIN ADULT PO) Take by mouth    valACYclovir (VALTREX) 1 g tablet Take 1 tablet by mouth as needed    estradiol (ESTRACE) 1 MG tablet Take 1 tablet by mouth daily    oxyCODONE-acetaminophen (PERCOCET) 5-325 MG per tablet Take 1 tablet by mouth every 48 hours as needed for Pain.    dilTIAZem (CARDIZEM CD) 360 MG extended release capsule TAKE 1 CAPSULE BY MOUTH DAILY    ELIQUIS 5 MG TABS tablet TAKE 1 TABLET BY MOUTH TWICE DAILY    ondansetron (ZOFRAN) 4 MG tablet Take 1 tablet by mouth every 8 hours as needed

## 2024-11-15 ENCOUNTER — OFFICE VISIT (OUTPATIENT)
Age: 59
End: 2024-11-15
Payer: MEDICARE

## 2024-11-15 VITALS
SYSTOLIC BLOOD PRESSURE: 140 MMHG | BODY MASS INDEX: 48.82 KG/M2 | DIASTOLIC BLOOD PRESSURE: 70 MMHG | HEIGHT: 65 IN | HEART RATE: 72 BPM | WEIGHT: 293 LBS

## 2024-11-15 DIAGNOSIS — I48.0 PAROXYSMAL ATRIAL FIBRILLATION (HCC): Primary | ICD-10-CM

## 2024-11-15 PROCEDURE — G8428 CUR MEDS NOT DOCUMENT: HCPCS | Performed by: INTERNAL MEDICINE

## 2024-11-15 PROCEDURE — 99214 OFFICE O/P EST MOD 30 MIN: CPT | Performed by: INTERNAL MEDICINE

## 2024-11-15 PROCEDURE — G8417 CALC BMI ABV UP PARAM F/U: HCPCS | Performed by: INTERNAL MEDICINE

## 2024-11-15 PROCEDURE — 3017F COLORECTAL CA SCREEN DOC REV: CPT | Performed by: INTERNAL MEDICINE

## 2024-11-15 PROCEDURE — G8484 FLU IMMUNIZE NO ADMIN: HCPCS | Performed by: INTERNAL MEDICINE

## 2024-11-15 PROCEDURE — 1036F TOBACCO NON-USER: CPT | Performed by: INTERNAL MEDICINE

## 2024-11-15 PROCEDURE — 93000 ELECTROCARDIOGRAM COMPLETE: CPT | Performed by: INTERNAL MEDICINE

## 2024-11-15 RX ORDER — LISINOPRIL AND HYDROCHLOROTHIAZIDE 10; 12.5 MG/1; MG/1
1 TABLET ORAL DAILY
COMMUNITY
Start: 2024-10-30 | End: 2025-10-30

## 2024-11-15 NOTE — PROGRESS NOTES
[facility] CARDIOLOGY, [address], [address] [place] [address]  PHONE: [phone]  Vanna Smith  1965    Chief Complant:    Chief Complaint   Patient presents with    Atrial Fibrillation    Results     echo      Consultation is requested by [unfilled] for evaluation of Atrial Fibrillation and Results (echo)    Reason for Consultation: afib    History:  Vanna Smith is a very pleasant 59 y.o. female with a past medical and cardiac history significant for pAF, MICHOACANO, HTN, obesity and presents for follow up of atrial fibrillation. She has had a history of pAF, episodes of rapid irregular HR, palpitations, some SOB, TELLO, fatigue. Monitor with no significant AF, overall symptoms mild.     Cardiac PMH: (Old records have been reviewed and summarized below)  8/30/2023 cardiac catheterization with normal epicardial coronary arteries normal cardiac output and pulmonary capillary wedge pressure mildly elevated PA pressure   TTE (10/16/24): EF 55-60%  Monitor (8/7/24): no AF, PVC ~19%     Reviewed office note Dr. Nichols 8/23/24    Past Medical History, Past Surgical History, Family history, Social History, and Medications were all reviewed with the patient today and updated as necessary.     Current Outpatient Medications   Medication Sig Dispense Refill    lisinopril-hydroCHLOROthiazide (PRINZIDE;ZESTORETIC) 10-12.5 MG per tablet Take 1 tablet by mouth daily      diclofenac sodium (VOLTAREN) 1 % GEL Apply 4g to the affected area 4 times daily. 100 g 1    dilTIAZem (CARDIZEM CD) 180 MG extended release capsule Take 1 capsule by mouth in the morning and at bedtime 180 capsule 3    flecainide (TAMBOCOR) 50 MG tablet Take 1 tablet by mouth 2 times daily 180 tablet 3    Multiple Vitamin (MULTIVITAMIN ADULT PO) Take by mouth      valACYclovir (VALTREX) 1 g tablet Take 1 tablet by mouth as needed      estradiol (ESTRACE) 1 MG tablet Take 1 tablet by mouth daily      oxyCODONE-acetaminophen

## 2024-12-24 ENCOUNTER — TELEPHONE (OUTPATIENT)
Dept: ORTHOPEDIC SURGERY | Age: 59
End: 2024-12-24

## 2024-12-24 NOTE — TELEPHONE ENCOUNTER
Patient has apt 1/7 LBP Dr Cervantes did surg 2022 pain has escalated today wants to know if someone can see her today so she don't have to go to ER?

## 2025-02-25 NOTE — PROGRESS NOTES
mouth 2 times daily Take bid    Calcium Carbonate (CALCIUM 500 PO) Take 1 tablet by mouth daily    Cyanocobalamin (VITAMIN B-12 PO) Take 1,000 mg by mouth daily    ferrous sulfate (IRON 325) 325 (65 Fe) MG tablet Take 65 mg by mouth every morning (before breakfast)     No current facility-administered medications for this visit.           REVIEW OF SYSTEMS:   CONSTITUTIONAL:   There is no history of fever, chills, night sweats, weight loss, weight gain, persistent fatigue, or lethargy/hypersomnolence.   CARDIAC:   No chest pain, pressure, discomfort, palpitations, orthopnea, murmurs, or edema.   GI:   No dysphagia, heartburn reflux, nausea/vomiting, diarrhea, abdominal pain, or bleeding.   NEURO:   There is no history of AMS, persistent headache, decreased level of consciousness, seizures, or motor or sensory deficits.      PHYSICAL EXAM:    Vitals:    02/26/25 0948   BP: (!) 164/90   Resp: 15   SpO2: 94%   Weight: (!) 174.6 kg (385 lb)   Height: 1.651 m (5' 5\")             GENERAL APPEARANCE:   The patient is morbidly obese and in no respiratory distress, on RA.   HEENT:   PERRL.  Conjunctivae unremarkable.   Nasal mucosa is without epistaxis, exudate, or polyps.  Gums and dentition are unremarkable.     NECK/LYMPHATIC:   Symmetrical with no elevation of jugular venous pulsation.  Trachea midline. LUNGS:   Normal respiratory effort with symmetrical lung expansion.   Breath sounds clear.   HEART:   There is a regular rate and rhythm.  No murmur, rub, or gallop.  There is no edema in the lower extremities.   NEURO:   The patient is alert and oriented to person, place, and time.  Memory appears intact and mood is normal.  No gross sensorimotor deficits are present.          ASSESSMENT:  (Medical Decision Making)      Diagnosis Orders   1. MICHOACANO (obstructive sleep apnea) -pt to continue to work on improving her compliance. Supplies and a mask fit with a nasal mask and a chin strap have been ordered since pt is not

## 2025-02-26 ENCOUNTER — OFFICE VISIT (OUTPATIENT)
Dept: SLEEP MEDICINE | Age: 60
End: 2025-02-26
Payer: MEDICARE

## 2025-02-26 VITALS
SYSTOLIC BLOOD PRESSURE: 164 MMHG | HEIGHT: 65 IN | DIASTOLIC BLOOD PRESSURE: 90 MMHG | OXYGEN SATURATION: 94 % | WEIGHT: 293 LBS | RESPIRATION RATE: 15 BRPM | BODY MASS INDEX: 48.82 KG/M2

## 2025-02-26 DIAGNOSIS — G47.00 INSOMNIA, UNSPECIFIED TYPE: ICD-10-CM

## 2025-02-26 DIAGNOSIS — G47.33 OSA (OBSTRUCTIVE SLEEP APNEA): Primary | ICD-10-CM

## 2025-02-26 DIAGNOSIS — R09.02 HYPOXEMIA: ICD-10-CM

## 2025-02-26 PROCEDURE — 99214 OFFICE O/P EST MOD 30 MIN: CPT | Performed by: PHYSICIAN ASSISTANT

## 2025-02-26 PROCEDURE — G2211 COMPLEX E/M VISIT ADD ON: HCPCS | Performed by: PHYSICIAN ASSISTANT

## 2025-02-26 ASSESSMENT — SLEEP AND FATIGUE QUESTIONNAIRES
HOW LIKELY ARE YOU TO NOD OFF OR FALL ASLEEP IN A CAR, WHILE STOPPED FOR A FEW MINUTES IN TRAFFIC: WOULD NEVER DOZE
HOW LIKELY ARE YOU TO NOD OFF OR FALL ASLEEP WHILE WATCHING TV: WOULD NEVER DOZE
HOW LIKELY ARE YOU TO NOD OFF OR FALL ASLEEP WHILE SITTING QUIETLY AFTER LUNCH WITHOUT ALCOHOL: WOULD NEVER DOZE
HOW LIKELY ARE YOU TO NOD OFF OR FALL ASLEEP WHILE SITTING AND READING: WOULD NEVER DOZE
HOW LIKELY ARE YOU TO NOD OFF OR FALL ASLEEP WHEN YOU ARE A PASSENGER IN A CAR FOR AN HOUR WITHOUT A BREAK: WOULD NEVER DOZE
HOW LIKELY ARE YOU TO NOD OFF OR FALL ASLEEP WHILE LYING DOWN TO REST IN THE AFTERNOON WHEN CIRCUMSTANCES PERMIT: HIGH CHANCE OF DOZING
HOW LIKELY ARE YOU TO NOD OFF OR FALL ASLEEP WHILE SITTING INACTIVE IN A PUBLIC PLACE: WOULD NEVER DOZE
ESS TOTAL SCORE: 3
HOW LIKELY ARE YOU TO NOD OFF OR FALL ASLEEP WHILE SITTING AND TALKING TO SOMEONE: WOULD NEVER DOZE

## 2025-02-26 NOTE — PATIENT INSTRUCTIONS
I would like you to try extended release or time release melatonin. I would like you to start with 5mg tablets. You should take the melatonin 60-90 mins before going to bed. This should help you fall asleep but also help you stay asleep without making you groggy. If 5mg is not enough, you could take 2 tablets to make it 10mg but I would not recommend going higher on your dose. If it is not working, please call or send me a [employer] message.             The company who will be taking care of your CPAP supplies is:    Address: [address] [place] [address] [id number], [place] [id number]  Phone: [phone]   Fax: [phone]

## 2025-03-19 NOTE — PROGRESS NOTES
Chronic Pain Follow-up Note    Date: March 26, 2025  Patient Name: Vanna Smith  MRN: [id number]  PCP: Marcin Uribe Jr.  Referring Provider: No ref. provider found    Assessment:   Diagnosis:  1. Primary osteoarthritis of left knee    2. Facet arthropathy, lumbar    3. Chronic bilateral low back pain without sciatica    4. Chronic pain syndrome        Plan:      General Recommendations: The pain condition that the patient suffers from is best treated with a multidisciplinary approach that involves an increase in physical activity to prevent de-conditioning and worsening of the pain cycle, as well as psychological counseling (formal and/or informal) to address the co-morbid psychological effects of pain.  Treatment will often involve judicious use of pain medications and interventional procedures to decrease the pain, allowing the patient to participate in the physical activity that will ultimately produce long-lasting pain reductions.  The goal of the multidisciplinary approach is to return the patient to a higher level of overall function and to restore their ability to perform activities of daily living.    Testing:  Reviewed PCP notes.  Will hold off on MRI for now since back pain improved with time.  Needs updated UDS at next visit.     Therapy:  Completed physical therapy, both land-based and aquatic, in 2020 with no change in her pain.  Has been doing her home exercise program regularly since that time including 3-4 times per week over the past 6 weeks.  Tried a TENS unit without benefit.     Medications:  Refilled Percocet 5/325 mg.  She is able to take it up to 3 times per day, but typically takes it less frequently and makes a prescription for 90 pills last 2 to 3 months.  3 prescription sent in with fill dates of 3/26/2025, 4/25/2025, and 5/25/2025.  ORT: 1 (depression)  Last UDS (results): 1/2024 (appropriate)  Opioid agreement signed: 3/2025  [facility] database: evaluated today,

## 2025-03-26 ENCOUNTER — OFFICE VISIT (OUTPATIENT)
Age: 60
End: 2025-03-26
Payer: MEDICARE

## 2025-03-26 DIAGNOSIS — M17.12 PRIMARY OSTEOARTHRITIS OF LEFT KNEE: Primary | ICD-10-CM

## 2025-03-26 DIAGNOSIS — M47.816 FACET ARTHROPATHY, LUMBAR: ICD-10-CM

## 2025-03-26 DIAGNOSIS — M54.50 CHRONIC BILATERAL LOW BACK PAIN WITHOUT SCIATICA: ICD-10-CM

## 2025-03-26 DIAGNOSIS — G89.4 CHRONIC PAIN SYNDROME: ICD-10-CM

## 2025-03-26 DIAGNOSIS — G89.29 CHRONIC BILATERAL LOW BACK PAIN WITHOUT SCIATICA: ICD-10-CM

## 2025-03-26 PROCEDURE — G2211 COMPLEX E/M VISIT ADD ON: HCPCS | Performed by: ANESTHESIOLOGY

## 2025-03-26 PROCEDURE — 99214 OFFICE O/P EST MOD 30 MIN: CPT | Performed by: ANESTHESIOLOGY

## 2025-03-26 RX ORDER — OXYCODONE AND ACETAMINOPHEN 5; 325 MG/1; MG/1
1 TABLET ORAL 3 TIMES DAILY
Qty: 90 TABLET | Refills: 0 | Status: SHIPPED | OUTPATIENT
Start: 2025-04-25 | End: 2025-05-25

## 2025-03-26 RX ORDER — OXYCODONE AND ACETAMINOPHEN 5; 325 MG/1; MG/1
1 TABLET ORAL 3 TIMES DAILY
Qty: 90 TABLET | Refills: 0 | Status: SHIPPED | OUTPATIENT
Start: 2025-05-25 | End: 2025-06-24

## 2025-03-26 RX ORDER — OXYCODONE AND ACETAMINOPHEN 5; 325 MG/1; MG/1
1 TABLET ORAL 3 TIMES DAILY
Qty: 90 TABLET | Refills: 0 | Status: SHIPPED | OUTPATIENT
Start: 2025-03-26 | End: 2025-04-25

## 2025-03-26 ASSESSMENT — ENCOUNTER SYMPTOMS
ABDOMINAL PAIN: 0
BACK PAIN: 1
SHORTNESS OF BREATH: 0

## 2025-06-10 ENCOUNTER — PATIENT MESSAGE (OUTPATIENT)
Age: 60
End: 2025-06-10

## 2025-06-11 RX ORDER — FLECAINIDE ACETATE 50 MG/1
50 TABLET ORAL 2 TIMES DAILY
Qty: 180 TABLET | Refills: 0 | Status: SHIPPED | OUTPATIENT
Start: 2025-06-11

## 2025-06-11 NOTE — TELEPHONE ENCOUNTER
Requested Prescriptions     Pending Prescriptions Disp Refills    flecainide (TAMBOCOR) 50 MG tablet 180 tablet 0     Sig: Take 1 tablet by mouth 2 times daily

## 2025-07-02 ENCOUNTER — OFFICE VISIT (OUTPATIENT)
Age: 60
End: 2025-07-02
Payer: MEDICARE

## 2025-07-02 VITALS
SYSTOLIC BLOOD PRESSURE: 128 MMHG | HEIGHT: 65 IN | BODY MASS INDEX: 48.82 KG/M2 | HEART RATE: 78 BPM | WEIGHT: 293 LBS | DIASTOLIC BLOOD PRESSURE: 72 MMHG

## 2025-07-02 DIAGNOSIS — I48.0 PAROXYSMAL ATRIAL FIBRILLATION (HCC): Primary | ICD-10-CM

## 2025-07-02 PROCEDURE — 93000 ELECTROCARDIOGRAM COMPLETE: CPT | Performed by: INTERNAL MEDICINE

## 2025-07-02 PROCEDURE — 99214 OFFICE O/P EST MOD 30 MIN: CPT | Performed by: INTERNAL MEDICINE

## 2025-07-02 RX ORDER — LISINOPRIL AND HYDROCHLOROTHIAZIDE 12.5; 2 MG/1; MG/1
TABLET ORAL
COMMUNITY
Start: 2025-06-11

## 2025-07-02 RX ORDER — FLECAINIDE ACETATE 50 MG/1
50 TABLET ORAL 2 TIMES DAILY
Qty: 180 TABLET | Refills: 3 | Status: SHIPPED | OUTPATIENT
Start: 2025-07-02

## 2025-07-02 RX ORDER — DILTIAZEM HYDROCHLORIDE 180 MG/1
180 CAPSULE, COATED, EXTENDED RELEASE ORAL 2 TIMES DAILY
Qty: 180 CAPSULE | Refills: 3 | Status: SHIPPED | OUTPATIENT
Start: 2025-07-02

## 2025-07-02 NOTE — PROGRESS NOTES
[facility] CARDIOLOGY, [address], [address] [place] [address]  PHONE: [phone]  Vanna Smith  1965    Chief Complant:    Chief Complaint   Patient presents with    Atrial Fibrillation    6 Month Follow-Up      Consultation is requested by [unfilled] for evaluation of Atrial Fibrillation and 6 Month Follow-Up    Reason for Consultation: afib    History:  Vanna Smith is a very pleasant 60 y.o. female with a past medical and cardiac history significant for pAF, MIHCOACANO, HTN, obesity and presents for follow up of atrial fibrillation. She has had a history of pAF, episodes of rapid irregular HR, palpitations, some SOB, TELLO, fatigue. She is doing well.     Cardiac PMH: (Old records have been reviewed and summarized below)  8/30/2023 cardiac catheterization with normal epicardial coronary arteries normal cardiac output and pulmonary capillary wedge pressure mildly elevated PA pressure   TTE (10/16/24): EF 55-60%  Monitor (8/7/24): no AF, PVC ~19%     Reviewed office note Dr. Samayoa 6/26/25    Past Medical History, Past Surgical History, Family history, Social History, and Medications were all reviewed with the patient today and updated as necessary.     Current Outpatient Medications   Medication Sig Dispense Refill    lisinopril-hydroCHLOROthiazide (PRINZIDE;ZESTORETIC) 20-12.5 MG per tablet       cephALEXin (KEFLEX) 250 MG capsule       dilTIAZem (CARDIZEM CD) 180 MG extended release capsule Take 1 capsule by mouth in the morning and at bedtime 180 capsule 3    flecainide (TAMBOCOR) 50 MG tablet Take 1 tablet by mouth 2 times daily 180 tablet 3    diclofenac sodium (VOLTAREN) 1 % GEL Apply 4g to the affected area 4 times daily. 100 g 1    Multiple Vitamin (MULTIVITAMIN ADULT PO) Take by mouth      valACYclovir (VALTREX) 1 g tablet Take 1 tablet by mouth as needed      estradiol (ESTRACE) 1 MG tablet Take 1 tablet by mouth daily      oxyCODONE-acetaminophen (PERCOCET) 5-325 MG per

## 2025-08-06 ENCOUNTER — OFFICE VISIT (OUTPATIENT)
Age: 60
End: 2025-08-06
Payer: MEDICARE

## 2025-08-06 DIAGNOSIS — M17.12 PRIMARY OSTEOARTHRITIS OF LEFT KNEE: ICD-10-CM

## 2025-08-06 DIAGNOSIS — Z51.81 ENCOUNTER FOR THERAPEUTIC DRUG MONITORING: ICD-10-CM

## 2025-08-06 DIAGNOSIS — G89.29 CHRONIC BILATERAL LOW BACK PAIN WITHOUT SCIATICA: ICD-10-CM

## 2025-08-06 DIAGNOSIS — M47.816 FACET ARTHROPATHY, LUMBAR: ICD-10-CM

## 2025-08-06 DIAGNOSIS — G89.4 CHRONIC PAIN SYNDROME: Primary | ICD-10-CM

## 2025-08-06 DIAGNOSIS — G89.4 CHRONIC PAIN SYNDROME: ICD-10-CM

## 2025-08-06 DIAGNOSIS — M54.50 CHRONIC BILATERAL LOW BACK PAIN WITHOUT SCIATICA: ICD-10-CM

## 2025-08-06 PROCEDURE — 99214 OFFICE O/P EST MOD 30 MIN: CPT | Performed by: ANESTHESIOLOGY

## 2025-08-06 PROCEDURE — G2211 COMPLEX E/M VISIT ADD ON: HCPCS | Performed by: ANESTHESIOLOGY

## 2025-08-06 RX ORDER — OXYCODONE AND ACETAMINOPHEN 5; 325 MG/1; MG/1
1 TABLET ORAL EVERY 8 HOURS PRN
Qty: 90 TABLET | Refills: 0 | Status: SHIPPED | OUTPATIENT
Start: 2025-10-14 | End: 2025-11-13

## 2025-08-06 RX ORDER — OXYCODONE AND ACETAMINOPHEN 5; 325 MG/1; MG/1
1 TABLET ORAL EVERY 8 HOURS PRN
Qty: 90 TABLET | Refills: 0 | Status: SHIPPED | OUTPATIENT
Start: 2025-08-15 | End: 2025-09-14

## 2025-08-06 RX ORDER — OXYCODONE AND ACETAMINOPHEN 5; 325 MG/1; MG/1
1 TABLET ORAL EVERY 8 HOURS PRN
Qty: 90 TABLET | Refills: 0 | Status: SHIPPED | OUTPATIENT
Start: 2025-09-14 | End: 2025-10-14

## 2025-08-06 ASSESSMENT — ENCOUNTER SYMPTOMS
BACK PAIN: 1
ABDOMINAL PAIN: 0
SHORTNESS OF BREATH: 0

## 2025-08-09 LAB — DRUGS UR: NORMAL

## 2025-08-11 ENCOUNTER — PATIENT MESSAGE (OUTPATIENT)
Age: 60
End: 2025-08-11

## 2025-09-02 ASSESSMENT — SLEEP AND FATIGUE QUESTIONNAIRES
HOW LIKELY ARE YOU TO NOD OFF OR FALL ASLEEP IN A CAR, WHILE STOPPED FOR A FEW MINUTES IN TRAFFIC: WOULD NEVER DOZE
HOW LIKELY ARE YOU TO NOD OFF OR FALL ASLEEP WHILE SITTING AND READING: SLIGHT CHANCE OF DOZING
HOW LIKELY ARE YOU TO NOD OFF OR FALL ASLEEP WHEN YOU ARE A PASSENGER IN A CAR FOR AN HOUR WITHOUT A BREAK: WOULD NEVER DOZE
HOW LIKELY ARE YOU TO NOD OFF OR FALL ASLEEP WHILE SITTING INACTIVE IN A PUBLIC PLACE: WOULD NEVER DOZE
HOW LIKELY ARE YOU TO NOD OFF OR FALL ASLEEP WHILE SITTING QUIETLY AFTER LUNCH WITHOUT ALCOHOL: SLIGHT CHANCE OF DOZING
HOW LIKELY ARE YOU TO NOD OFF OR FALL ASLEEP IN A CAR, WHILE STOPPED FOR A FEW MINUTES IN TRAFFIC: WOULD NEVER DOZE
HOW LIKELY ARE YOU TO NOD OFF OR FALL ASLEEP WHILE SITTING QUIETLY AFTER LUNCH WITHOUT ALCOHOL: SLIGHT CHANCE OF DOZING
HOW LIKELY ARE YOU TO NOD OFF OR FALL ASLEEP WHILE WATCHING TV: SLIGHT CHANCE OF DOZING
HOW LIKELY ARE YOU TO NOD OFF OR FALL ASLEEP WHILE LYING DOWN TO REST IN THE AFTERNOON WHEN CIRCUMSTANCES PERMIT: SLIGHT CHANCE OF DOZING
ESS TOTAL SCORE: 4
HOW LIKELY ARE YOU TO NOD OFF OR FALL ASLEEP WHILE SITTING AND TALKING TO SOMEONE: WOULD NEVER DOZE
HOW LIKELY ARE YOU TO NOD OFF OR FALL ASLEEP WHILE LYING DOWN TO REST IN THE AFTERNOON WHEN CIRCUMSTANCES PERMIT: SLIGHT CHANCE OF DOZING
HOW LIKELY ARE YOU TO NOD OFF OR FALL ASLEEP WHILE SITTING AND READING: SLIGHT CHANCE OF DOZING
HOW LIKELY ARE YOU TO NOD OFF OR FALL ASLEEP WHEN YOU ARE A PASSENGER IN A CAR FOR AN HOUR WITHOUT A BREAK: WOULD NEVER DOZE
HOW LIKELY ARE YOU TO NOD OFF OR FALL ASLEEP WHILE SITTING AND TALKING TO SOMEONE: WOULD NEVER DOZE
HOW LIKELY ARE YOU TO NOD OFF OR FALL ASLEEP WHILE WATCHING TV: SLIGHT CHANCE OF DOZING
HOW LIKELY ARE YOU TO NOD OFF OR FALL ASLEEP WHILE SITTING INACTIVE IN A PUBLIC PLACE: WOULD NEVER DOZE

## 2025-09-03 ENCOUNTER — OFFICE VISIT (OUTPATIENT)
Dept: SLEEP MEDICINE | Age: 60
End: 2025-09-03
Payer: MEDICARE

## 2025-09-03 VITALS
TEMPERATURE: 97.7 F | RESPIRATION RATE: 14 BRPM | HEIGHT: 65 IN | SYSTOLIC BLOOD PRESSURE: 136 MMHG | HEART RATE: 79 BPM | OXYGEN SATURATION: 98 % | WEIGHT: 293 LBS | DIASTOLIC BLOOD PRESSURE: 73 MMHG | BODY MASS INDEX: 48.82 KG/M2

## 2025-09-03 DIAGNOSIS — R09.02 HYPOXEMIA: ICD-10-CM

## 2025-09-03 DIAGNOSIS — G47.33 OSA (OBSTRUCTIVE SLEEP APNEA): Primary | ICD-10-CM

## 2025-09-03 PROCEDURE — 99214 OFFICE O/P EST MOD 30 MIN: CPT | Performed by: PHYSICIAN ASSISTANT

## (undated) DEVICE — GLIDESHEATH SLENDER STAINLESS STEEL KIT: Brand: GLIDESHEATH SLENDER

## (undated) DEVICE — GUIDEWIRE VASC L260CM DIA0.035IN RAD 3MM J TIP L7CM PTFE

## (undated) DEVICE — CATHETER DIAG 5FR L100CM LUMN ID0.047IN JL3.5 CRV 0 SIDE H

## (undated) DEVICE — CATHETER COR DIAG PIGTAILS PIG 145 CRV 5FR 110CM 6 SIDE H

## (undated) DEVICE — MICROPUNCTURE INTRODUCER SET SILHOUETTE TRANSITIONLESS WITH NITINOL WIRE GUIDE: Brand: MICROPUNCTURE

## (undated) DEVICE — CATHETER DIAG AD 5FR L100CM COR NYL JUDKINS R 5 DILATED

## (undated) DEVICE — DEVICE COMPR LNG 27 CM VASC BND

## (undated) DEVICE — CATHETER THRMDIL 7FR L110CM STD PULM ART 4 INFUS LUMN SWN